# Patient Record
Sex: FEMALE | Race: WHITE | NOT HISPANIC OR LATINO | URBAN - METROPOLITAN AREA
[De-identification: names, ages, dates, MRNs, and addresses within clinical notes are randomized per-mention and may not be internally consistent; named-entity substitution may affect disease eponyms.]

---

## 2019-09-02 ENCOUNTER — EMERGENCY (EMERGENCY)
Facility: HOSPITAL | Age: 24
LOS: 1 days | Discharge: HOME | End: 2019-09-02
Attending: EMERGENCY MEDICINE | Admitting: EMERGENCY MEDICINE
Payer: MEDICARE

## 2019-09-02 VITALS
SYSTOLIC BLOOD PRESSURE: 107 MMHG | OXYGEN SATURATION: 99 % | RESPIRATION RATE: 18 BRPM | DIASTOLIC BLOOD PRESSURE: 75 MMHG | TEMPERATURE: 98 F | HEART RATE: 107 BPM

## 2019-09-02 DIAGNOSIS — R53.83 OTHER FATIGUE: ICD-10-CM

## 2019-09-02 DIAGNOSIS — R68.83 CHILLS (WITHOUT FEVER): ICD-10-CM

## 2019-09-02 DIAGNOSIS — R11.0 NAUSEA: ICD-10-CM

## 2019-09-02 DIAGNOSIS — R00.0 TACHYCARDIA, UNSPECIFIED: ICD-10-CM

## 2019-09-02 PROCEDURE — 99284 EMERGENCY DEPT VISIT MOD MDM: CPT

## 2019-09-02 NOTE — ED PROVIDER NOTE - NSFOLLOWUPINSTRUCTIONS_ED_ALL_ED_FT
Can take tylenol or motrin every 6hrs as needed for pain.  Stay well hydrated.  Try to increase food/protein intake,  Return for fevers, persistent vomit, uncontrolled pain, worsening breathing, worsening lightheaded.  Follow up with primary doctor within 1-2 days.   Follow up with psychiatrist.

## 2019-09-02 NOTE — ED PROVIDER NOTE - PHYSICAL EXAMINATION
no LE edema, normal equal distal pulses.  neck FROM. Strength 5/5. No bony ttp, FROM all extremities. Normal equal distal pulses. Steady unassisted gait.

## 2019-09-02 NOTE — ED PROVIDER NOTE - PROGRESS NOTE DETAILS
[place]: initial labs hemolyzed. glucose 60s. Hydrated, repeat labs improved. Pt feeling much better. BP is borderline. Pt unsure of baseline BP. Has no symptoms of hypotension even while ambulating in ED. Observed for several hrs w/ only improvement in symptoms. Comfortable for dc, outpt pmd /psych f/u.

## 2019-09-02 NOTE — ED PROVIDER NOTE - PATIENT PORTAL LINK FT
You can access the FollowMyHealth Patient Portal offered by [facility] by registering at the following website: [email or web address]. By joining [facility]’s FollowMyHealth portal, you will also be able to view your health information using other applications (apps) compatible with our system.

## 2019-09-02 NOTE — ED ADULT TRIAGE NOTE - ARRIVAL INFO ADDITIONAL COMMENTS
pt has hx of anorexia and hyponatremia and father called 911 tonight for increased tiredness and feeling cold.  pt denies pain, sob.

## 2019-09-02 NOTE — ED ADULT NURSE NOTE - NSIMPLEMENTINTERV_GEN_ALL_ED
Implemented All Universal Safety Interventions:  [place] to call system. Call bell, personal items and telephone within reach. Instruct patient to call for assistance. Room bathroom lighting operational. Non-slip footwear when patient is off stretcher. Physically safe environment: no spills, clutter or unnecessary equipment. Stretcher in lowest position, wheels locked, appropriate side rails in place.

## 2019-09-02 NOTE — ED ADULT NURSE NOTE - OBJECTIVE STATEMENT
Patient to the ED with complaint of feeling tired, sleepy, cold a bit nauseated and has a sore throat, has hx of OCD and eating disorder

## 2019-09-02 NOTE — ED PROVIDER NOTE - CLINICAL SUMMARY MEDICAL DECISION MAKING FREE TEXT BOX
24F PMH anorexia, p/w feeling cold/fatigue. Pt states that she was feeling cold/shivers tonight, took her temp and was low at 94, so called EMS. Shivers/cold now resolved. Occasional minimal nausea. Also states that she has been following w/ PMD Dr. Saenz and recent labs have noted "low WBC and low Na" slowly declining, unsure of exact numbers. Started on Na tablets. Her PMD presumes 2/2 eating disorder. Her psychiatrist increased zyprexa dose and started on 1mg klonopin qhs ~1w ago. Since then pt states she is waking up much later in the day. Slight tachycardia, other vitals wnl, exam as above. Well appearing.  ddx: Possible metabolic component, less likely developing infection. Likely aspect of dehydration.   CBC, cmp, TSH, hcg, IVF, reassess.

## 2019-09-03 VITALS
SYSTOLIC BLOOD PRESSURE: 92 MMHG | DIASTOLIC BLOOD PRESSURE: 59 MMHG | OXYGEN SATURATION: 98 % | RESPIRATION RATE: 18 BRPM | HEART RATE: 76 BPM

## 2019-09-03 LAB
ALBUMIN SERPL ELPH-MCNC: 3.4 G/DL — SIGNIFICANT CHANGE UP (ref 3.3–5)
ALBUMIN SERPL ELPH-MCNC: 4.4 G/DL — SIGNIFICANT CHANGE UP (ref 3.3–5)
ALP SERPL-CCNC: 40 U/L — SIGNIFICANT CHANGE UP (ref 40–120)
ALP SERPL-CCNC: SIGNIFICANT CHANGE UP U/L (ref 40–120)
ALT FLD-CCNC: 15 U/L — SIGNIFICANT CHANGE UP (ref 10–45)
ALT FLD-CCNC: SIGNIFICANT CHANGE UP U/L (ref 10–45)
ANION GAP SERPL CALC-SCNC: 11 MMOL/L — SIGNIFICANT CHANGE UP (ref 5–17)
ANION GAP SERPL CALC-SCNC: 9 MMOL/L — SIGNIFICANT CHANGE UP (ref 5–17)
AST SERPL-CCNC: 25 U/L — SIGNIFICANT CHANGE UP (ref 10–40)
AST SERPL-CCNC: SIGNIFICANT CHANGE UP U/L (ref 10–40)
BASOPHILS # BLD AUTO: 0.04 K/UL — SIGNIFICANT CHANGE UP (ref 0–0.2)
BASOPHILS NFR BLD AUTO: 1 % — SIGNIFICANT CHANGE UP (ref 0–2)
BILIRUB SERPL-MCNC: 0.2 MG/DL — SIGNIFICANT CHANGE UP (ref 0.2–1.2)
BILIRUB SERPL-MCNC: 0.3 MG/DL — SIGNIFICANT CHANGE UP (ref 0.2–1.2)
BUN SERPL-MCNC: 4 MG/DL — LOW (ref 7–23)
BUN SERPL-MCNC: 4 MG/DL — LOW (ref 7–23)
CALCIUM SERPL-MCNC: 7.2 MG/DL — LOW (ref 8.4–10.5)
CALCIUM SERPL-MCNC: 9 MG/DL — SIGNIFICANT CHANGE UP (ref 8.4–10.5)
CHLORIDE SERPL-SCNC: 103 MMOL/L — SIGNIFICANT CHANGE UP (ref 96–108)
CHLORIDE SERPL-SCNC: 93 MMOL/L — LOW (ref 96–108)
CO2 SERPL-SCNC: 25 MMOL/L — SIGNIFICANT CHANGE UP (ref 22–31)
CO2 SERPL-SCNC: 25 MMOL/L — SIGNIFICANT CHANGE UP (ref 22–31)
CREAT SERPL-MCNC: 0.57 MG/DL — SIGNIFICANT CHANGE UP (ref 0.5–1.3)
CREAT SERPL-MCNC: 0.61 MG/DL — SIGNIFICANT CHANGE UP (ref 0.5–1.3)
EOSINOPHIL # BLD AUTO: 0.02 K/UL — SIGNIFICANT CHANGE UP (ref 0–0.5)
EOSINOPHIL NFR BLD AUTO: 0.5 % — SIGNIFICANT CHANGE UP (ref 0–6)
GLUCOSE SERPL-MCNC: 289 MG/DL — HIGH (ref 70–99)
GLUCOSE SERPL-MCNC: 64 MG/DL — LOW (ref 70–99)
HCG SERPL-ACNC: <0 MIU/ML — SIGNIFICANT CHANGE UP
HCT VFR BLD CALC: 34.1 % — LOW (ref 34.5–45)
HGB BLD-MCNC: 11.2 G/DL — LOW (ref 11.5–15.5)
IMM GRANULOCYTES NFR BLD AUTO: 0.3 % — SIGNIFICANT CHANGE UP (ref 0–1.5)
LYMPHOCYTES # BLD AUTO: 1.36 K/UL — SIGNIFICANT CHANGE UP (ref 1–3.3)
LYMPHOCYTES # BLD AUTO: 34.7 % — SIGNIFICANT CHANGE UP (ref 13–44)
MCHC RBC-ENTMCNC: 29 PG — SIGNIFICANT CHANGE UP (ref 27–34)
MCHC RBC-ENTMCNC: 32.8 GM/DL — SIGNIFICANT CHANGE UP (ref 32–36)
MCV RBC AUTO: 88.3 FL — SIGNIFICANT CHANGE UP (ref 80–100)
MONOCYTES # BLD AUTO: 0.39 K/UL — SIGNIFICANT CHANGE UP (ref 0–0.9)
MONOCYTES NFR BLD AUTO: 9.9 % — SIGNIFICANT CHANGE UP (ref 2–14)
NEUTROPHILS # BLD AUTO: 2.1 K/UL — SIGNIFICANT CHANGE UP (ref 1.8–7.4)
NEUTROPHILS NFR BLD AUTO: 53.6 % — SIGNIFICANT CHANGE UP (ref 43–77)
NRBC # BLD: 0 /100 WBCS — SIGNIFICANT CHANGE UP (ref 0–0)
PLATELET # BLD AUTO: 294 K/UL — SIGNIFICANT CHANGE UP (ref 150–400)
POTASSIUM SERPL-MCNC: 3.3 MMOL/L — LOW (ref 3.5–5.3)
POTASSIUM SERPL-MCNC: SIGNIFICANT CHANGE UP MMOL/L (ref 3.5–5.3)
POTASSIUM SERPL-SCNC: 3.3 MMOL/L — LOW (ref 3.5–5.3)
POTASSIUM SERPL-SCNC: SIGNIFICANT CHANGE UP MMOL/L (ref 3.5–5.3)
PROT SERPL-MCNC: 5.1 G/DL — LOW (ref 6–8.3)
PROT SERPL-MCNC: 7 G/DL — SIGNIFICANT CHANGE UP (ref 6–8.3)
RBC # BLD: 3.86 M/UL — SIGNIFICANT CHANGE UP (ref 3.8–5.2)
RBC # FLD: 11.7 % — SIGNIFICANT CHANGE UP (ref 10.3–14.5)
SODIUM SERPL-SCNC: 129 MMOL/L — LOW (ref 135–145)
SODIUM SERPL-SCNC: 137 MMOL/L — SIGNIFICANT CHANGE UP (ref 135–145)
TSH SERPL-MCNC: 0.9 UIU/ML — SIGNIFICANT CHANGE UP (ref 0.35–4.94)
WBC # BLD: 3.92 K/UL — SIGNIFICANT CHANGE UP (ref 3.8–10.5)
WBC # FLD AUTO: 3.92 K/UL — SIGNIFICANT CHANGE UP (ref 3.8–10.5)

## 2019-09-03 PROCEDURE — 36415 COLL VENOUS BLD VENIPUNCTURE: CPT

## 2019-09-03 PROCEDURE — 96360 HYDRATION IV INFUSION INIT: CPT

## 2019-09-03 PROCEDURE — 84702 CHORIONIC GONADOTROPIN TEST: CPT

## 2019-09-03 PROCEDURE — 99283 EMERGENCY DEPT VISIT LOW MDM: CPT | Mod: 25

## 2019-09-03 PROCEDURE — 80053 COMPREHEN METABOLIC PANEL: CPT

## 2019-09-03 PROCEDURE — 96361 HYDRATE IV INFUSION ADD-ON: CPT

## 2019-09-03 PROCEDURE — 84443 ASSAY THYROID STIM HORMONE: CPT

## 2019-09-03 PROCEDURE — 85025 COMPLETE CBC W/AUTO DIFF WBC: CPT

## 2019-09-03 RX ORDER — SODIUM CHLORIDE 9 MG/ML
1000 INJECTION INTRAMUSCULAR; INTRAVENOUS; SUBCUTANEOUS ONCE
Refills: 0 | Status: COMPLETED | OUTPATIENT
Start: 2019-09-03 | End: 2019-09-03

## 2019-09-03 RX ORDER — SODIUM CHLORIDE 9 MG/ML
1000 INJECTION, SOLUTION INTRAVENOUS
Refills: 0 | Status: DISCONTINUED | OUTPATIENT
Start: 2019-09-03 | End: 2019-09-11

## 2019-09-03 RX ADMIN — SODIUM CHLORIDE 1000 MILLILITER(S): 9 INJECTION, SOLUTION INTRAVENOUS at 01:08

## 2019-09-03 RX ADMIN — SODIUM CHLORIDE 1000 MILLILITER(S): 9 INJECTION INTRAMUSCULAR; INTRAVENOUS; SUBCUTANEOUS at 00:04

## 2019-09-03 RX ADMIN — SODIUM CHLORIDE 1000 MILLILITER(S): 9 INJECTION INTRAMUSCULAR; INTRAVENOUS; SUBCUTANEOUS at 04:39

## 2019-09-03 RX ADMIN — SODIUM CHLORIDE 1000 MILLILITER(S): 9 INJECTION INTRAMUSCULAR; INTRAVENOUS; SUBCUTANEOUS at 03:34

## 2019-09-03 RX ADMIN — SODIUM CHLORIDE 1000 MILLILITER(S): 9 INJECTION INTRAMUSCULAR; INTRAVENOUS; SUBCUTANEOUS at 01:10

## 2019-09-03 RX ADMIN — SODIUM CHLORIDE 1000 MILLILITER(S): 9 INJECTION, SOLUTION INTRAVENOUS at 02:09

## 2019-09-26 ENCOUNTER — APPOINTMENT (OUTPATIENT)
Dept: NEPHROLOGY | Facility: CLINIC | Age: 24
End: 2019-09-26
Payer: MEDICARE

## 2019-09-26 ENCOUNTER — LABORATORY RESULT (OUTPATIENT)
Age: 24
End: 2019-09-26

## 2019-09-26 VITALS — HEART RATE: 96 BPM | DIASTOLIC BLOOD PRESSURE: 20 MMHG | SYSTOLIC BLOOD PRESSURE: 82 MMHG

## 2019-09-26 VITALS — HEIGHT: 62 IN | HEART RATE: 72 BPM | BODY MASS INDEX: 15.51 KG/M2 | WEIGHT: 84.25 LBS

## 2019-09-26 VITALS — DIASTOLIC BLOOD PRESSURE: 62 MMHG | HEART RATE: 84 BPM | SYSTOLIC BLOOD PRESSURE: 88 MMHG

## 2019-09-26 VITALS — DIASTOLIC BLOOD PRESSURE: 56 MMHG | HEART RATE: 84 BPM | SYSTOLIC BLOOD PRESSURE: 90 MMHG

## 2019-09-26 DIAGNOSIS — Z00.00 ENCOUNTER FOR GENERAL ADULT MEDICAL EXAMINATION W/OUT ABNORMAL FINDINGS: ICD-10-CM

## 2019-09-26 PROCEDURE — 99205 OFFICE O/P NEW HI 60 MIN: CPT | Mod: 25

## 2019-09-26 PROCEDURE — 36415 COLL VENOUS BLD VENIPUNCTURE: CPT

## 2019-09-26 PROCEDURE — 93000 ELECTROCARDIOGRAM COMPLETE: CPT

## 2019-09-26 NOTE — PHYSICAL EXAM
[General Appearance - Alert] : alert [General Appearance - In No Acute Distress] : in no acute distress [PERRL With Normal Accommodation] : pupils were equal in size, round, and reactive to light [Sclera] : the sclera and conjunctiva were normal [Extraocular Movements] : extraocular movements were intact [Outer Ear] : the ears and nose were normal in appearance [Neck Appearance] : the appearance of the neck was normal [Oropharynx] : the oropharynx was normal [Neck Cervical Mass (___cm)] : no neck mass was observed [Jugular Venous Distention Increased] : there was no jugular-venous distention [Thyroid Nodule] : there were no palpable thyroid nodules [Thyroid Diffuse Enlargement] : the thyroid was not enlarged [Heart Rate And Rhythm] : heart rate was normal and rhythm regular [Auscultation Breath Sounds / Voice Sounds] : lungs were clear to auscultation bilaterally [Heart Sounds] : normal S1 and S2 [Heart Sounds Gallop] : no gallops [Murmurs] : no murmurs [Heart Sounds Pericardial Friction Rub] : no pericardial rub [Edema] : there was no peripheral edema [Bowel Sounds] : normal bowel sounds [Abdomen Soft] : soft [Abdomen Tenderness] : non-tender [Abdomen Mass (___ Cm)] : no abdominal mass palpated [No CVA Tenderness] : no ~M costovertebral angle tenderness [No Spinal Tenderness] : no spinal tenderness [Abnormal Walk] : normal gait [Nail Clubbing] : no clubbing  or cyanosis of the fingernails [Musculoskeletal - Swelling] : no joint swelling seen [Motor Tone] : muscle strength and tone were normal [Skin Color & Pigmentation] : normal skin color and pigmentation [Skin Turgor] : normal skin turgor [] : no rash [Cranial Nerves] : cranial nerves 2-12 were intact [No Focal Deficits] : no focal deficits [Motor Exam] : the motor exam was normal [Oriented To Time, Place, And Person] : oriented to person, place, and time [Impaired Insight] : insight and judgment were intact [FreeTextEntry1] : apathetic

## 2019-09-26 NOTE — ADDENDUM
[FreeTextEntry1] :  Documented by Dion Bledsoe acting as a scribe for Dr. Justin Redd on 09/26/2019.

## 2019-09-26 NOTE — END OF VISIT
[FreeTextEntry3] : All medical record entries made by the Scribe were at my, Dr. Justin Redd, direction and personally dictated by me on 09/26/2019. I have reviewed the chart and agree that the record accurately reflects my personal performance of the history, physical exam, assessment and plan. I have also personally directed, reviewed, and agreed with the chart.

## 2019-09-26 NOTE — HISTORY OF PRESENT ILLNESS
[FreeTextEntry1] : The patient is a 24 year old female presenting for initial evaluation of psychiatric status and hyponatremia.\par \par Chief Complaint:\par - Patient reports that she has "not felt well" for about eight years. She states that she doesn't leave the house much due to energy levels and OCD "rituals." She also has anorexia for which says she is trying to eat more now, such as fruits and ice cream and is able to keep it down. Her father says that the eating disorder preceded the OCD tendencies. She denies bulimia, having taken water pills or medication to induce diarrhea. She does take MiraLAX for constipation. She is unable to articulate what prevents her from eating. She c/o often have abdominal pains.\par - She reports that her menstrual periods are not always normal. She says that she sleeps a lot during the day. She reports often feeling chronically cold. She has been told she has gum recession and she has pain in her wisdom teeth; she has yet to be evaluated for this.\par - Reports she has had recent brain MRI at [facility] within the last year.\par \par Past Medical History:\par - h/o OCD: Pt describes her "rituals" as cleanliness and mouth care. She sees a psychiatrist Dr. Watts (tel: [phone])  in [place]. She has taken Anafranil in the past.\par - h/o anorexia over +8 years. She reports occasional dysphagia, but it has been many years since she had a true problem with swallowing. \par - h/o hyponatremia.\par - h/o hair thinning.\par - She denies problems with dermatology, ENT (other than occasional mild dysphagia), eyes, pulmonary, cardiology, renal, arthritis.\par \par Past Surgical History/ Hospitalizations:\par - She has been hospitalized for hyponatremia.\par - No surgical history.\par \par Family History:\par - Patient has 10 siblings all reportedly heathy except one with Downs Syndrome.\par - Grandmother with diabetes and HTN. \par - No other significant FMH.\par \par Social History:\par - She was born and raised in [place], [place] currently lives with her grandparents in [place]. Patient spends most of her time at home. 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 She denies smoking, EtOH use, does not live with pets. She has visited family in [place] somewhat recently.\par \par Current Medications: Klonopin 1mg QD, Zyprexa 10mg QD, paroxetine 10mg QD, Na BID.\par \par Allergies: NKDA; except one psychiatric medication that she cannot remember.

## 2019-09-26 NOTE — ASSESSMENT
[FreeTextEntry1] : Assessment: Patient is a 24 year old female with a PMH of OCD, anorexia, and hyponatremia presenting today for initial evaluation of psychiatric status and hyponatremia. Patient is accompanied by her father who assisted in gathering her history. She lives in [place], [place] with her grandparents. Her blood pressure in office today is hypotensive and orthostatic. EKG taken in office today is normal. \par Spoke with Dr. Watts to discuss the possibility that there is something organic to patient's mental status. Have advised patient to retrieve the disk from her recent MRI. Have advised patient to have a neuropsychology evaluation.\par \par Changes to Medications: none at this time\par \par EKG taken, results above. Labs were drawn and we will talk to patient next week after lab results return to schedule a follow-up appointment.

## 2019-10-01 LAB
24R-OH-CALCIDIOL SERPL-MCNC: 42.1 PG/ML
25(OH)D3 SERPL-MCNC: 21.6 NG/ML
ACTH SER-ACNC: 18.1 PG/ML
ALBUMIN MFR SERPL ELPH: 65.4 %
ALBUMIN SERPL ELPH-MCNC: 4.5 G/DL
ALBUMIN SERPL-MCNC: 4.4 G/DL
ALBUMIN/GLOB SERPL: 1.8 RATIO
ALDOSTERONE SERUM: 23.4 NG/DL
ALP BLD-CCNC: 54 U/L
ALPHA1 GLOB MFR SERPL ELPH: 3.3 %
ALPHA1 GLOB SERPL ELPH-MCNC: 0.2 G/DL
ALPHA2 GLOB MFR SERPL ELPH: 8.3 %
ALPHA2 GLOB SERPL ELPH-MCNC: 0.6 G/DL
ALT SERPL-CCNC: 15 U/L
ANA SER IF-ACNC: NEGATIVE
ANION GAP SERPL CALC-SCNC: 17 MMOL/L
APPEARANCE: CLEAR
AST SERPL-CCNC: 29 U/L
B-GLOBULIN MFR SERPL ELPH: 10.5 %
B-GLOBULIN SERPL ELPH-MCNC: 0.7 G/DL
B2 GLYCOPROT1 IGA SERPL IA-ACNC: 20.8 SAU
B2 GLYCOPROT1 IGG SER-ACNC: 11.2 SGU
B2 GLYCOPROT1 IGM SER-ACNC: <5 SMU
B2 MICROGLOB SERPL-MCNC: 1.6 MG/L
BACTERIA UR CULT: NORMAL
BACTERIA: NEGATIVE
BASOPHILS # BLD AUTO: 0.04 K/UL
BASOPHILS NFR BLD AUTO: 1.2 %
BILIRUB DIRECT SERPL-MCNC: 0.1 MG/DL
BILIRUB SERPL-MCNC: 0.2 MG/DL
BILIRUBIN URINE: NEGATIVE
BLOOD URINE: NEGATIVE
BUN SERPL-MCNC: <3 MG/DL
C3 SERPL-MCNC: 59 MG/DL
C4 SERPL-MCNC: 14 MG/DL
CA-I SERPL-SCNC: 1.24 MMOL/L
CALCIUM SERPL-MCNC: 9.7 MG/DL
CALCIUM SERPL-MCNC: 9.7 MG/DL
CARDIOLIPIN AB SER IA-ACNC: NEGATIVE
CARDIOLIPIN IGM SER-MCNC: 6.3 MPL
CARDIOLIPIN IGM SER-MCNC: <5 GPL
CHLORIDE SERPL-SCNC: 93 MMOL/L
CHOLEST SERPL-MCNC: 188 MG/DL
CHOLEST/HDLC SERPL: 1.6 RATIO
CO2 SERPL-SCNC: 20 MMOL/L
COLLAGEN CTX SERPL-MCNC: 1108 PG/ML
COLOR: COLORLESS
CORTIS SERPL-MCNC: 13.8 UG/DL
CREAT SERPL-MCNC: 0.71 MG/DL
CRP SERPL-MCNC: <0.1 MG/DL
CYSTATIN C SERPL-MCNC: 0.65 MG/L
DEPRECATED D DIMER PPP IA-ACNC: <150 NG/ML DDU
DEPRECATED KAPPA LC FREE/LAMBDA SER: 0.85 RATIO
DEPRECATED KAPPA LC FREE/LAMBDA SER: 0.85 RATIO
DHEA-S SERPL-MCNC: 237 UG/DL
ENA SCL70 IGG SER IA-ACNC: <0.2 AL
EOSINOPHIL # BLD AUTO: 0.02 K/UL
EOSINOPHIL NFR BLD AUTO: 0.6 %
ERYTHROCYTE [SEDIMENTATION RATE] IN BLOOD BY WESTERGREN METHOD: 2 MM/HR
ESTRADIOL SERPL-MCNC: 36 PG/ML
FERRITIN SERPL-MCNC: 20 NG/ML
FOLATE SERPL-MCNC: 8.2 NG/ML
FSH SERPL-MCNC: 7.5 IU/L
GAMMA GLOB FLD ELPH-MCNC: 0.8 G/DL
GAMMA GLOB MFR SERPL ELPH: 12.5 %
GFR/BSA.PRED SERPLBLD CYS-BASED-ARV: 124 ML/MIN
GLUCOSE QUALITATIVE U: NEGATIVE
GLUCOSE SERPL-MCNC: 84 MG/DL
HBV SURFACE AB SER QL: REACTIVE
HBV SURFACE AG SER QL: NONREACTIVE
HCT VFR BLD CALC: 32.9 %
HCV AB SER QL: NONREACTIVE
HCV S/CO RATIO: 0.11 S/CO
HCYS SERPL-MCNC: 19.6 UMOL/L
HDLC SERPL-MCNC: 117 MG/DL
HGB BLD-MCNC: 11 G/DL
HYALINE CASTS: 0 /LPF
IC SERPL QL C1Q BIND: < 1.2 MCG EQ/ML
IGA SER QL IEP: 246 MG/DL
IGG SER QL IEP: 828 MG/DL
IGM SER QL IEP: 100 MG/DL
IMM GRANULOCYTES NFR BLD AUTO: 0.3 %
INTERPRETATION SERPL IEP-IMP: NORMAL
IRON SATN MFR SERPL: 15 %
IRON SERPL-MCNC: 50 UG/DL
KAPPA LC CSF-MCNC: 1.18 MG/DL
KAPPA LC CSF-MCNC: 1.18 MG/DL
KAPPA LC SERPL-MCNC: 1 MG/DL
KAPPA LC SERPL-MCNC: 1 MG/DL
KETONES URINE: NEGATIVE
LDLC SERPL CALC-MCNC: 55 MG/DL
LEUKOCYTE ESTERASE URINE: NEGATIVE
LH SERPL-ACNC: 2.7 IU/L
LYMPHOCYTES # BLD AUTO: 1 K/UL
LYMPHOCYTES NFR BLD AUTO: 31.2 %
M PROTEIN SPEC IFE-MCNC: NORMAL
MAGNESIUM SERPL-MCNC: 2.1 MG/DL
MAN DIFF?: NORMAL
MCHC RBC-ENTMCNC: 28.9 PG
MCHC RBC-ENTMCNC: 33.4 GM/DL
MCV RBC AUTO: 86.6 FL
METHYLMALONATE SERPL-SCNC: 135 NMOL/L
MICROSCOPIC-UA: NORMAL
MONOCYTES # BLD AUTO: 0.2 K/UL
MONOCYTES NFR BLD AUTO: 6.2 %
MPO AB + PR3 PNL SER: NORMAL
NEUTROPHILS # BLD AUTO: 1.94 K/UL
NEUTROPHILS NFR BLD AUTO: 60.5 %
NITRITE URINE: NEGATIVE
OSMOLALITY SERPL: 260 MOSMOL/KG
PARATHYROID HORMONE INTACT: 25 PG/ML
PH URINE: 7
PHOSPHATE SERPL-MCNC: 4.4 MG/DL
PLATELET # BLD AUTO: 276 K/UL
POTASSIUM SERPL-SCNC: 4.8 MMOL/L
PROLACTIN SERPL-MCNC: 14.6 NG/ML
PROT SERPL-MCNC: 6.8 G/DL
PROTEIN URINE: NEGATIVE
RBC # BLD: 3.8 M/UL
RBC # FLD: 12.3 %
RED BLOOD CELLS URINE: 0 /HPF
RENIN PLASMA: 3.3 PG/ML
RHEUMATOID FACT SER QL: <10 IU/ML
SMOOTH MUSCLE AB SER QL IF: NORMAL
SODIUM SERPL-SCNC: 130 MMOL/L
SPECIFIC GRAVITY URINE: 1
SQUAMOUS EPITHELIAL CELLS: 1 /HPF
T3FREE SERPL-MCNC: 2.69 PG/ML
T3RU NFR SERPL: 1.2 TBI
T4 FREE SERPL-MCNC: 1.1 NG/DL
T4 SERPL-MCNC: 7.1 UG/DL
THYROGLOB AB SERPL-ACNC: <20 IU/ML
THYROPEROXIDASE AB SERPL IA-ACNC: <10 IU/ML
TIBC SERPL-MCNC: 340 UG/DL
TRIGL SERPL-MCNC: 81 MG/DL
TSH SERPL-ACNC: 2.29 UIU/ML
UIBC SERPL-MCNC: 290 UG/DL
URATE SERPL-MCNC: 2.9 MG/DL
UROBILINOGEN URINE: NORMAL
VIT B12 SERPL-MCNC: 626 PG/ML
WBC # FLD AUTO: 3.21 K/UL
WHITE BLOOD CELLS URINE: 0 /HPF
ZPP BLD-MCNC: 40 MCG/DL

## 2019-10-04 LAB
ALP BONE SERPL-MCNC: 9.6 MCG/L
CELIAC DISEASE INTERPRETATION: NORMAL
CELIAC GENE PAIRS PRESENT: YES
CORPROPORPHRYIN PLASMA: 0.6 MCG/L
DQ ALPHA 1: NORMAL
DQ BETA 1: NORMAL
GAD65 AB SER-MCNC: 0 NMOL/L
HEPTACARBOXYPORPHRYIN PLASMA: NORMAL MCG/L
HEXACARBOXYPORPHRYIN PLASMA: NORMAL MCG/L
IMMUNOGLOBULIN A (IGA): 238 MG/DL
PENTACARBOXYPORHPRYIN PLASMA: NORMAL MCG/L
PORPHRYINS PLASMA INTERP: NORMAL
PROTOPORPHRYIN PLASMA: NORMAL MCG/L
TOTAL PORPHRYINS: 0.6 MCG/L
UROPORPHRYIN PLASMA: NORMAL MCG/L

## 2019-10-10 LAB
ANNOTATION COMMENT IMP: NORMAL
HLA-DQ2: POSITIVE
HLA-DQ8 QL: POSITIVE
REF LAB TEST METHOD: NORMAL

## 2019-11-11 ENCOUNTER — LABORATORY RESULT (OUTPATIENT)
Age: 24
End: 2019-11-11

## 2019-11-11 ENCOUNTER — APPOINTMENT (OUTPATIENT)
Dept: NEPHROLOGY | Facility: CLINIC | Age: 24
End: 2019-11-11
Payer: MEDICARE

## 2019-11-11 VITALS — WEIGHT: 81.25 LBS | HEIGHT: 62 IN | OXYGEN SATURATION: 98 % | BODY MASS INDEX: 14.95 KG/M2 | HEART RATE: 72 BPM

## 2019-11-11 VITALS — DIASTOLIC BLOOD PRESSURE: 66 MMHG | HEART RATE: 72 BPM | SYSTOLIC BLOOD PRESSURE: 96 MMHG

## 2019-11-11 VITALS — SYSTOLIC BLOOD PRESSURE: 96 MMHG | HEART RATE: 84 BPM | DIASTOLIC BLOOD PRESSURE: 70 MMHG

## 2019-11-11 VITALS — DIASTOLIC BLOOD PRESSURE: 70 MMHG | SYSTOLIC BLOOD PRESSURE: 98 MMHG | HEART RATE: 72 BPM

## 2019-11-11 DIAGNOSIS — F54 POLYDIPSIA: ICD-10-CM

## 2019-11-11 DIAGNOSIS — I95.9 HYPOTENSION, UNSPECIFIED: ICD-10-CM

## 2019-11-11 DIAGNOSIS — F50.9 EATING DISORDER, UNSPECIFIED: ICD-10-CM

## 2019-11-11 DIAGNOSIS — R63.1 POLYDIPSIA: ICD-10-CM

## 2019-11-11 DIAGNOSIS — F42.9 OBSESSIVE-COMPULSIVE DISORDER, UNSPECIFIED: ICD-10-CM

## 2019-11-11 DIAGNOSIS — E87.1 HYPO-OSMOLALITY AND HYPONATREMIA: ICD-10-CM

## 2019-11-11 PROCEDURE — 36415 COLL VENOUS BLD VENIPUNCTURE: CPT

## 2019-11-11 PROCEDURE — 99214 OFFICE O/P EST MOD 30 MIN: CPT | Mod: 25

## 2019-11-11 NOTE — ASSESSMENT
[FreeTextEntry1] : Plan:\par 1) Hyponatremia: Patient was reportedly hospitalized last week with a Na of 120 and have advised patient's father to request her discharge notes to be sent to our office. On her labs from 9/26/19 Na was 130. We will repeat blood and urine tests today.\par 2) Primary Polydipsia: Patient states she is drinking in excess of four quarts of water per day. Advised patient to keep a log of her water intake to better monitor her polydipsia. Will assess labs today.\par 3) Patient blood pressure is hypotensive in office today.\par 4) Spoke with Dr. Watts, patient's psychiatrist, who advised that patient's eating disorder along with hyponatremia may be adaptive in helping her circumvent some of her expected lifestyle choices that may be associated with her family and communal status.\par \par Changes to Medications: none at this time\par \par Labs were drawn and patient will return in 1-3 weeks for a follow-up appointment pending the results of her outpatient workup and labs today.

## 2019-11-11 NOTE — END OF VISIT
[FreeTextEntry3] : All medical record entries made by the Scribe were at my, Dr. Justin Redd, direction and personally dictated by me on 11/11/2019. I have reviewed the chart and agree that the record accurately reflects my personal performance of the history, physical exam, assessment and plan. I have also personally directed, reviewed, and agreed with the chart.  [FreeTextEntry2] :  Documented by Dion Bledsoe acting as a scribe for Dr. Justin Redd on 11/11/2019.

## 2019-11-11 NOTE — HISTORY OF PRESENT ILLNESS
[FreeTextEntry1] : Patient is a 24 year old female with a PMH of OCD, anorexia, and hyponatremia presenting today for follow-up evaluation of psychiatric status and hyponatremia. \par \par Interval Data:\par - Labs from 9/26/19 reveal: WBC 3.21, HGB 11.0, HCT 32.9, cortisol PM 13.8, osmolality 260, homocysteine 19.6, Na 130, Cl 93, CO2 of 20, Vit D 25 of 21.6, C3 of 59, aldosterone 23.4, beta 2 glycoprotein 1 IgA Ab 20.8, collagen type I 1108, total porphyrins 0.6.\par \par Current Complaints:\par - Patient reports that she was admitted to [facility] last week for hyponatremia with Na of 120. She was reportedly placed on a feeding tube and had chest x-ray obtained. She reports that on average she drinks 1-1.5 gallons of water per day, and she states this is both out of habit and from thirst. Patient weighs 81lbs today, a loss of 3lbs from 6 weeks ago. Patient denies purging or taking diuretics. She states she has intermittent alternating constipation and loose stools. Patient reports her energy is intermittently low.\par - Her father brought in disc from recent MRI.\par - She denies taking Vit D, and denies ever having had a bone density test.\par \par Current Medications: Klonopin 1mg QD, Zyprexa 10mg QD, paroxetine 10mg QD, Na BID.

## 2019-11-11 NOTE — PHYSICAL EXAM
[General Appearance - Alert] : alert [Sclera] : the sclera and conjunctiva were normal [General Appearance - In No Acute Distress] : in no acute distress [Extraocular Movements] : extraocular movements were intact [PERRL With Normal Accommodation] : pupils were equal in size, round, and reactive to light [Outer Ear] : the ears and nose were normal in appearance [Neck Appearance] : the appearance of the neck was normal [Oropharynx] : the oropharynx was normal [Jugular Venous Distention Increased] : there was no jugular-venous distention [Neck Cervical Mass (___cm)] : no neck mass was observed [Thyroid Diffuse Enlargement] : the thyroid was not enlarged [Thyroid Nodule] : there were no palpable thyroid nodules [Auscultation Breath Sounds / Voice Sounds] : lungs were clear to auscultation bilaterally [Heart Rate And Rhythm] : heart rate was normal and rhythm regular [Heart Sounds] : normal S1 and S2 [Heart Sounds Gallop] : no gallops [Murmurs] : no murmurs [Edema] : there was no peripheral edema [Heart Sounds Pericardial Friction Rub] : no pericardial rub [Bowel Sounds] : normal bowel sounds [Abdomen Soft] : soft [Abdomen Tenderness] : non-tender [Abdomen Mass (___ Cm)] : no abdominal mass palpated [No CVA Tenderness] : no ~M costovertebral angle tenderness [No Spinal Tenderness] : no spinal tenderness [Abnormal Walk] : normal gait [Nail Clubbing] : no clubbing  or cyanosis of the fingernails [Musculoskeletal - Swelling] : no joint swelling seen [Motor Tone] : muscle strength and tone were normal [Skin Color & Pigmentation] : normal skin color and pigmentation [] : no rash [Skin Turgor] : normal skin turgor [Motor Exam] : the motor exam was normal [Cranial Nerves] : cranial nerves 2-12 were intact [Impaired Insight] : insight and judgment were intact [No Focal Deficits] : no focal deficits [Oriented To Time, Place, And Person] : oriented to person, place, and time [Affect] : the affect was normal

## 2019-11-11 NOTE — ADDENDUM
[FreeTextEntry1] :  Documented by Dion Bledsoe acting as a scribe for Dr. Justin Redd on 11/11/2019.

## 2019-11-12 LAB
24R-OH-CALCIDIOL SERPL-MCNC: 37 PG/ML
25(OH)D3 SERPL-MCNC: 22.5 NG/ML
ALBUMIN SERPL ELPH-MCNC: 4.2 G/DL
ALP BLD-CCNC: 52 U/L
ALT SERPL-CCNC: 12 U/L
ANION GAP SERPL CALC-SCNC: 14 MMOL/L
APPEARANCE: CLEAR
AST SERPL-CCNC: 23 U/L
BACTERIA: NEGATIVE
BASOPHILS # BLD AUTO: 0.02 K/UL
BASOPHILS NFR BLD AUTO: 0.9 %
BILIRUB SERPL-MCNC: 0.2 MG/DL
BILIRUBIN URINE: NEGATIVE
BLOOD URINE: NEGATIVE
BUN SERPL-MCNC: <3 MG/DL
CALCIUM SERPL-MCNC: 9.5 MG/DL
CALCIUM SERPL-MCNC: 9.5 MG/DL
CHLORIDE ?TM UR-SCNC: <20 MMOL/L
CHLORIDE SERPL-SCNC: 92 MMOL/L
CHOLEST SERPL-MCNC: 187 MG/DL
CHOLEST/HDLC SERPL: 2 RATIO
CO2 SERPL-SCNC: 24 MMOL/L
COLOR: COLORLESS
CORTIS SERPL-MCNC: 16.9 UG/DL
CREAT SERPL-MCNC: 0.64 MG/DL
EOSINOPHIL # BLD AUTO: 0.02 K/UL
EOSINOPHIL NFR BLD AUTO: 0.9 %
FERRITIN SERPL-MCNC: 27 NG/ML
FOLATE SERPL-MCNC: 6.6 NG/ML
GLUCOSE QUALITATIVE U: NEGATIVE
GLUCOSE SERPL-MCNC: 83 MG/DL
HAPTOGLOB SERPL-MCNC: 114 MG/DL
HBV SURFACE AB SER QL: REACTIVE
HBV SURFACE AG SER QL: NONREACTIVE
HCT VFR BLD CALC: 32.8 %
HCV AB SER QL: NONREACTIVE
HCV S/CO RATIO: 0.11 S/CO
HCYS SERPL-MCNC: 17.1 UMOL/L
HDLC SERPL-MCNC: 93 MG/DL
HGB BLD-MCNC: 10.7 G/DL
HYALINE CASTS: 0 /LPF
IMM GRANULOCYTES NFR BLD AUTO: 0 %
IRON SATN MFR SERPL: 8 %
IRON SERPL-MCNC: 27 UG/DL
KETONES URINE: NEGATIVE
LDH SERPL-CCNC: 172 U/L
LDLC SERPL CALC-MCNC: 76 MG/DL
LEUKOCYTE ESTERASE URINE: NEGATIVE
LYMPHOCYTES # BLD AUTO: 0.86 K/UL
LYMPHOCYTES NFR BLD AUTO: 38.9 %
MAGNESIUM SERPL-MCNC: 2.1 MG/DL
MAN DIFF?: NORMAL
MCHC RBC-ENTMCNC: 28.8 PG
MCHC RBC-ENTMCNC: 32.6 GM/DL
MCV RBC AUTO: 88.2 FL
MICROSCOPIC-UA: NORMAL
MONOCYTES # BLD AUTO: 0.21 K/UL
MONOCYTES NFR BLD AUTO: 9.5 %
NEUTROPHILS # BLD AUTO: 1.1 K/UL
NEUTROPHILS NFR BLD AUTO: 49.8 %
NITRITE URINE: NEGATIVE
OSMOLALITY SERPL: 265 MOSMOL/KG
OSMOLALITY UR: 48 MOSM/KG
PARATHYROID HORMONE INTACT: 24 PG/ML
PH URINE: 7
PHOSPHATE SERPL-MCNC: 4 MG/DL
PLATELET # BLD AUTO: 368 K/UL
POTASSIUM SERPL-SCNC: 4.6 MMOL/L
PROT SERPL-MCNC: 6.6 G/DL
PROTEIN URINE: NEGATIVE
RBC # BLD: 3.72 M/UL
RBC # BLD: 3.72 M/UL
RBC # FLD: 12.5 %
RED BLOOD CELLS URINE: 0 /HPF
RETICS # AUTO: 1.5 %
RETICS AGGREG/RBC NFR: 55.1 K/UL
RHEUMATOID FACT SER QL: 11 IU/ML
SODIUM ?TM SUB UR QN: <20 MMOL/L
SODIUM SERPL-SCNC: 129 MMOL/L
SPECIFIC GRAVITY URINE: 1
SQUAMOUS EPITHELIAL CELLS: 0 /HPF
T3FREE SERPL-MCNC: 1.83 PG/ML
T3RU NFR SERPL: 1.1 TBI
T4 FREE SERPL-MCNC: 0.9 NG/DL
T4 SERPL-MCNC: 6.1 UG/DL
TIBC SERPL-MCNC: 324 UG/DL
TRIGL SERPL-MCNC: 88 MG/DL
TSH SERPL-ACNC: 1.92 UIU/ML
UIBC SERPL-MCNC: 297 UG/DL
URATE SERPL-MCNC: 2.7 MG/DL
UROBILINOGEN URINE: NORMAL
VIT B12 SERPL-MCNC: 698 PG/ML
WBC # FLD AUTO: 2.21 K/UL
WHITE BLOOD CELLS URINE: 0 /HPF

## 2019-11-16 LAB
ACTH SER-ACNC: 13.1 PG/ML
ALBUMIN MFR SERPL ELPH: 60.3 %
ALBUMIN SERPL-MCNC: 4 G/DL
ALBUMIN/GLOB SERPL: 1.5 RATIO
ALDOSTERONE SERUM: 22.9 NG/DL
ALP BONE SERPL-MCNC: 6.4 MCG/L
ALPHA1 GLOB MFR SERPL ELPH: 4.6 %
ALPHA1 GLOB SERPL ELPH-MCNC: 0.3 G/DL
ALPHA2 GLOB MFR SERPL ELPH: 11.8 %
ALPHA2 GLOB SERPL ELPH-MCNC: 0.8 G/DL
ANA SER IF-ACNC: NEGATIVE
B-GLOBULIN MFR SERPL ELPH: 11.1 %
B-GLOBULIN SERPL ELPH-MCNC: 0.7 G/DL
C3 SERPL-MCNC: 79 MG/DL
C4 SERPL-MCNC: 25 MG/DL
COLLAGEN CTX SERPL-MCNC: 822 PG/ML
DEPRECATED KAPPA LC FREE/LAMBDA SER: 0.82 RATIO
DIRECT COOMBS: NORMAL
GAMMA GLOB FLD ELPH-MCNC: 0.8 G/DL
GAMMA GLOB MFR SERPL ELPH: 12.2 %
IGA SER QL IEP: 282 MG/DL
IGG SER QL IEP: 827 MG/DL
IGM SER QL IEP: 105 MG/DL
INTERPRETATION SERPL IEP-IMP: NORMAL
KAPPA LC CSF-MCNC: 1.54 MG/DL
KAPPA LC SERPL-MCNC: 1.27 MG/DL
M PROTEIN SPEC IFE-MCNC: NORMAL
METHYLMALONATE SERPL-SCNC: 141 NMOL/L
MPO AB + PR3 PNL SER: NORMAL
PROT SERPL-MCNC: 6.6 G/DL
PROT SERPL-MCNC: 6.6 G/DL
RENIN PLASMA: <2.1 PG/ML
THYROGLOB AB SERPL-ACNC: <20 IU/ML
THYROPEROXIDASE AB SERPL IA-ACNC: <10 IU/ML

## 2019-11-20 LAB
DHEA-SULFATE, SERUM: 180 UG/DL
DOPAMINE UR-MCNC: 43 PG/ML
EPINEPH UR-MCNC: <15 PG/ML
NOREPINEPH UR-MCNC: 914 PG/ML

## 2019-12-04 ENCOUNTER — APPOINTMENT (OUTPATIENT)
Dept: NEPHROLOGY | Facility: CLINIC | Age: 24
End: 2019-12-04

## 2023-08-20 ENCOUNTER — INPATIENT (INPATIENT)
Facility: HOSPITAL | Age: 28
LOS: 4 days | Discharge: HOME | DRG: 883 | End: 2023-08-25
Attending: SPECIALIST | Admitting: SPECIALIST
Payer: MEDICARE

## 2023-08-20 VITALS
SYSTOLIC BLOOD PRESSURE: 111 MMHG | DIASTOLIC BLOOD PRESSURE: 81 MMHG | OXYGEN SATURATION: 99 % | HEIGHT: 62 IN | RESPIRATION RATE: 16 BRPM | TEMPERATURE: 94 F | HEART RATE: 110 BPM

## 2023-08-20 DIAGNOSIS — R00.0 TACHYCARDIA, UNSPECIFIED: ICD-10-CM

## 2023-08-20 DIAGNOSIS — Z29.9 ENCOUNTER FOR PROPHYLACTIC MEASURES, UNSPECIFIED: ICD-10-CM

## 2023-08-20 DIAGNOSIS — R63.0 ANOREXIA: ICD-10-CM

## 2023-08-20 DIAGNOSIS — D72.819 DECREASED WHITE BLOOD CELL COUNT, UNSPECIFIED: ICD-10-CM

## 2023-08-20 DIAGNOSIS — Z93.1 GASTROSTOMY STATUS: Chronic | ICD-10-CM

## 2023-08-20 DIAGNOSIS — E87.20 ACIDOSIS, UNSPECIFIED: ICD-10-CM

## 2023-08-20 DIAGNOSIS — F41.9 ANXIETY DISORDER, UNSPECIFIED: ICD-10-CM

## 2023-08-20 LAB
ALBUMIN SERPL ELPH-MCNC: 4.7 G/DL — SIGNIFICANT CHANGE UP (ref 3.3–5)
ALP SERPL-CCNC: 74 U/L — SIGNIFICANT CHANGE UP (ref 40–120)
ALT FLD-CCNC: 9 U/L — LOW (ref 10–45)
ANION GAP SERPL CALC-SCNC: 26 MMOL/L — HIGH (ref 5–17)
AST SERPL-CCNC: 21 U/L — SIGNIFICANT CHANGE UP (ref 10–40)
B-OH-BUTYR SERPL-SCNC: 9.7 MMOL/L — HIGH
BASOPHILS # BLD AUTO: 0.06 K/UL — SIGNIFICANT CHANGE UP (ref 0–0.2)
BASOPHILS NFR BLD AUTO: 1.6 % — SIGNIFICANT CHANGE UP (ref 0–2)
BILIRUB SERPL-MCNC: 0.4 MG/DL — SIGNIFICANT CHANGE UP (ref 0.2–1.2)
BUN SERPL-MCNC: 21 MG/DL — SIGNIFICANT CHANGE UP (ref 7–23)
CALCIUM SERPL-MCNC: 10.2 MG/DL — SIGNIFICANT CHANGE UP (ref 8.4–10.5)
CHLORIDE SERPL-SCNC: 98 MMOL/L — SIGNIFICANT CHANGE UP (ref 96–108)
CO2 SERPL-SCNC: 18 MMOL/L — LOW (ref 22–31)
CREAT SERPL-MCNC: 0.81 MG/DL — SIGNIFICANT CHANGE UP (ref 0.5–1.3)
EGFR: 100 ML/MIN/1.73M2 — SIGNIFICANT CHANGE UP
EOSINOPHIL # BLD AUTO: 0.01 K/UL — SIGNIFICANT CHANGE UP (ref 0–0.5)
EOSINOPHIL NFR BLD AUTO: 0.3 % — SIGNIFICANT CHANGE UP (ref 0–6)
GAS PNL BLDV: SIGNIFICANT CHANGE UP
GLUCOSE SERPL-MCNC: 62 MG/DL — LOW (ref 70–99)
HCT VFR BLD CALC: 42.5 % — SIGNIFICANT CHANGE UP (ref 34.5–45)
HGB BLD-MCNC: 13.8 G/DL — SIGNIFICANT CHANGE UP (ref 11.5–15.5)
IMM GRANULOCYTES NFR BLD AUTO: 0 % — SIGNIFICANT CHANGE UP (ref 0–0.9)
LACTATE SERPL-SCNC: 1.2 MMOL/L — SIGNIFICANT CHANGE UP (ref 0.5–2)
LYMPHOCYTES # BLD AUTO: 1.21 K/UL — SIGNIFICANT CHANGE UP (ref 1–3.3)
LYMPHOCYTES # BLD AUTO: 31.9 % — SIGNIFICANT CHANGE UP (ref 13–44)
MAGNESIUM SERPL-MCNC: 2.2 MG/DL — SIGNIFICANT CHANGE UP (ref 1.6–2.6)
MCHC RBC-ENTMCNC: 30.7 PG — SIGNIFICANT CHANGE UP (ref 27–34)
MCHC RBC-ENTMCNC: 32.5 GM/DL — SIGNIFICANT CHANGE UP (ref 32–36)
MCV RBC AUTO: 94.7 FL — SIGNIFICANT CHANGE UP (ref 80–100)
MONOCYTES # BLD AUTO: 0.24 K/UL — SIGNIFICANT CHANGE UP (ref 0–0.9)
MONOCYTES NFR BLD AUTO: 6.3 % — SIGNIFICANT CHANGE UP (ref 2–14)
NEUTROPHILS # BLD AUTO: 2.27 K/UL — SIGNIFICANT CHANGE UP (ref 1.8–7.4)
NEUTROPHILS NFR BLD AUTO: 59.9 % — SIGNIFICANT CHANGE UP (ref 43–77)
NRBC # BLD: 0 /100 WBCS — SIGNIFICANT CHANGE UP (ref 0–0)
PHOSPHATE SERPL-MCNC: 3.9 MG/DL — SIGNIFICANT CHANGE UP (ref 2.5–4.5)
PLATELET # BLD AUTO: 293 K/UL — SIGNIFICANT CHANGE UP (ref 150–400)
POTASSIUM SERPL-MCNC: 4 MMOL/L — SIGNIFICANT CHANGE UP (ref 3.5–5.3)
POTASSIUM SERPL-SCNC: 4 MMOL/L — SIGNIFICANT CHANGE UP (ref 3.5–5.3)
PROT SERPL-MCNC: 8 G/DL — SIGNIFICANT CHANGE UP (ref 6–8.3)
RBC # BLD: 4.49 M/UL — SIGNIFICANT CHANGE UP (ref 3.8–5.2)
RBC # FLD: 13.2 % — SIGNIFICANT CHANGE UP (ref 10.3–14.5)
SODIUM SERPL-SCNC: 142 MMOL/L — SIGNIFICANT CHANGE UP (ref 135–145)
WBC # BLD: 3.79 K/UL — LOW (ref 3.8–10.5)
WBC # FLD AUTO: 3.79 K/UL — LOW (ref 3.8–10.5)

## 2023-08-20 PROCEDURE — 99285 EMERGENCY DEPT VISIT HI MDM: CPT

## 2023-08-20 RX ORDER — OLANZAPINE 15 MG/1
1 TABLET, FILM COATED ORAL
Qty: 0 | Refills: 0 | DISCHARGE

## 2023-08-20 RX ORDER — METOPROLOL TARTRATE 50 MG
0.5 TABLET ORAL
Refills: 0 | DISCHARGE

## 2023-08-20 RX ORDER — POLYETHYLENE GLYCOL 3350 17 G/17G
0 POWDER, FOR SOLUTION ORAL
Qty: 0 | Refills: 0 | DISCHARGE

## 2023-08-20 RX ORDER — SODIUM CHLORIDE 9 MG/ML
1000 INJECTION, SOLUTION INTRAVENOUS
Refills: 0 | Status: DISCONTINUED | OUTPATIENT
Start: 2023-08-20 | End: 2023-08-21

## 2023-08-20 RX ORDER — FOLIC ACID 0.8 MG
1 TABLET ORAL ONCE
Refills: 0 | Status: COMPLETED | OUTPATIENT
Start: 2023-08-20 | End: 2023-08-20

## 2023-08-20 RX ORDER — SODIUM CHLORIDE 9 MG/ML
500 INJECTION INTRAMUSCULAR; INTRAVENOUS; SUBCUTANEOUS ONCE
Refills: 0 | Status: COMPLETED | OUTPATIENT
Start: 2023-08-20 | End: 2023-08-20

## 2023-08-20 RX ORDER — CLONAZEPAM 1 MG
1 TABLET ORAL
Qty: 0 | Refills: 0 | DISCHARGE

## 2023-08-20 RX ORDER — FOLIC ACID 0.8 MG
1 TABLET ORAL EVERY 24 HOURS
Refills: 0 | Status: DISCONTINUED | OUTPATIENT
Start: 2023-08-21 | End: 2023-08-25

## 2023-08-20 RX ORDER — PANTOPRAZOLE SODIUM 20 MG/1
40 TABLET, DELAYED RELEASE ORAL EVERY 24 HOURS
Refills: 0 | Status: DISCONTINUED | OUTPATIENT
Start: 2023-08-21 | End: 2023-08-25

## 2023-08-20 RX ORDER — THIAMINE MONONITRATE (VIT B1) 100 MG
100 TABLET ORAL ONCE
Refills: 0 | Status: COMPLETED | OUTPATIENT
Start: 2023-08-20 | End: 2023-08-20

## 2023-08-20 RX ORDER — THIAMINE MONONITRATE (VIT B1) 100 MG
100 TABLET ORAL EVERY 24 HOURS
Refills: 0 | Status: DISCONTINUED | OUTPATIENT
Start: 2023-08-21 | End: 2023-08-25

## 2023-08-20 RX ORDER — SODIUM BICARBONATE 1 MEQ/ML
0 SYRINGE (ML) INTRAVENOUS
Qty: 0 | Refills: 0 | DISCHARGE

## 2023-08-20 RX ADMIN — Medication 100 MILLIGRAM(S): at 21:52

## 2023-08-20 RX ADMIN — Medication 1 MILLIGRAM(S): at 21:50

## 2023-08-20 RX ADMIN — SODIUM CHLORIDE 1000 MILLILITER(S): 9 INJECTION INTRAMUSCULAR; INTRAVENOUS; SUBCUTANEOUS at 20:26

## 2023-08-20 RX ADMIN — SODIUM CHLORIDE 75 MILLILITER(S): 9 INJECTION, SOLUTION INTRAVENOUS at 21:40

## 2023-08-20 NOTE — H&P ADULT - NSHPSOCIALHISTORY_GEN_ALL_CORE
Lives with aide. Has support from parents.  Can reportedly stand but does not appear very active.  No toxic habits.

## 2023-08-20 NOTE — H&P ADULT - PROBLEM SELECTOR PLAN 1
Longstanding hx. Used to have PEG tube in past. Has had NGT for last 1yr. Was dislodged 2 days prior.   Pt does not typically take anything by mouth at all.    - D5 fluids standing  - q6h FSG to monitor for hypoglycemia  - plan for PEG tube tomorrow Longstanding hx. Used to have PEG tube in past. Has had NGT for last 1yr. Was dislodged 2 days prior.   Pt does not typically take anything by mouth at all.    - D5 fluids standing  - q6h FSG to monitor for hypoglycemia  - thiamine and folic acid  - plan for PEG tube tomorrow Longstanding hx. Used to have PEG tube in past. Has had NGT for last 1yr. Pt pulled out NGT 2 days prior during emotional event but no SI or thoughts of hurting herself.   Pt does not typically take anything by mouth at all.    - D5 fluids standing  - q6h FSG to monitor for hypoglycemia  - thiamine and folic acid  - plan for PEG tube tomorrow  - consider Psych consult if pt amenable

## 2023-08-20 NOTE — H&P ADULT - PROBLEM SELECTOR PLAN 3
Chronic leukopenia since 2019 but no neutropenia.  Suspect initial temp not accurate since it was axillary. No infectious ROS.    - trend CBC

## 2023-08-20 NOTE — H&P ADULT - PROBLEM SELECTOR PLAN 5
Mother reports hx of fast HR, but not aware of afib.  Sinus on admission ecg.  Home med: lopressor 12.5 PO qhs  Tachycardia upon admission, unclear baseline HR but pt hasn't had BB in two days. Also dehydrated.    - monitor HR  - lopressor prn

## 2023-08-20 NOTE — H&P ADULT - NSHPREVIEWOFSYSTEMS_GEN_ALL_CORE
REVIEW OF SYSTEMS:  CONSTITUTIONAL: No weakness, fevers or chills  EYES/ENT: No visual changes;  No vertigo or throat pain   NECK: No pain or stiffness  RESPIRATORY: No cough, wheezing, hemoptysis; No shortness of breath  CARDIOVASCULAR: No chest pain or palpitations  GASTROINTESTINAL: No abdominal or epigastric pain. No nausea, vomiting, or hematemesis; No diarrhea or constipation. No melena or hematochezia.  GENITOURINARY: No dysuria, frequency or hematuria  NEUROLOGICAL: No numbness or weakness  SKIN: No itching, rashes  EXTREMITIES: No pain, no joint swelling  NEURO: No headache, no trauma  PSYCH: No anxiety, no sadness, no SI.

## 2023-08-20 NOTE — ED ADULT NURSE NOTE - NSFALLRISKINTERV_ED_ALL_ED
Assistance OOB with selected safe patient handling equipment if applicable/Assistance with ambulation/Communicate fall risk and risk factors to all staff, patient, and family/Monitor gait and stability/Provide visual cue: yellow wristband, yellow gown, etc/Reinforce activity limits and safety measures with patient and family/Call bell, personal items and telephone in reach/Instruct patient to call for assistance before getting out of bed/chair/stretcher/Non-slip footwear applied when patient is off stretcher/[place] to call system/Physically safe environment - no spills, clutter or unnecessary equipment/Purposeful Proactive Rounding/Room/bathroom lighting operational, light cord in reach

## 2023-08-20 NOTE — ED PROVIDER NOTE - NSICDXPASTMEDICALHX_GEN_ALL_CORE_FT
PAST MEDICAL HISTORY:  Anorexia nervosa     Eating disorder     OCD (obsessive compulsive disorder)     OCD (obsessive compulsive disorder)

## 2023-08-20 NOTE — ED PROVIDER NOTE - CLINICAL SUMMARY MEDICAL DECISION MAKING FREE TEXT BOX
anorexia/ feeding tube dependant here after ng tube dislodged. now for peg placement. appears thin/ pale but no distress. labs obtained to r/o electrolyte abnormality, dehydration, infection. given 500ml ivf bolus. discussed with Dr. Buckley, aware of pt. rec admit and will see in AM to schedule procedure anorexia/ feeding tube dependant here after ng tube dislodged. now for peg placement. appears thin/ pale but no distress. labs obtained to r/o electrolyte abnormality, dehydration, infection. given 500ml ivf bolus. discussed with Dr. Buckley, aware of pt. rec admit and will see in AM to schedule procedure  labs resulted with anion gap acidosis. added on vgb/lactate/beta hydroxy. suspect starvation ketoacidosis from chronic poor po intake. given thiamine/ folic acid iv, started d5/0.9ns infusion. admit for further treatment. anorexia/ feeding tube dependant here after ng tube dislodged. now for peg placement. appears thin/ pale but no distress. temp low on arrival but pt refused oral/rectal temp. was done axillary and unreliable. labs obtained to r/o electrolyte abnormality, dehydration, infection. given 500ml ivf bolus. discussed with Dr. Buckley, aware of pt. rec admit and will see in AM to schedule procedure  labs resulted with anion gap acidosis. added on vgb/lactate/beta hydroxy. suspect starvation ketoacidosis from chronic poor po intake. given thiamine/ folic acid iv, started d5/0.9ns infusion. admit for further treatment.

## 2023-08-20 NOTE — H&P ADULT - HISTORY OF PRESENT ILLNESS
27yo F PMH anorexia (NGT dependant, used to have PEG tube), OCD who presents here after ng tube dislodged 2 days ago. Pt reports feeling dry. Reports nasogastric tube fell out. Sometime her stomach bothers her a little but not currently. Does not have regular bowel movements, she's not sure when last one was; at baseline she might have one per week; urinates twice per day. Denies fever, chills, CP, palpitations, diarrhea, dysuria.  Pt defers most questions to mother, however very little information given.      ED course:   P/w temp 94.5 axillary (pt refused oral/rectal), -->repeat normal  labs: wbc 3.79, bicarb 16, AG 26, glucose 62, lactate 1.2, VBG pH 7.27  ecg: regular, sinus, QTc 495  Interventions: folic acid 1mg IV, thiamine 100mg IV, NS 500cc, D5NS @75cc/h 27yo F PMH anorexia (NGT dependant, used to have PEG tube), OCD who presents here after ng tube dislodged 2 days ago. Pt reports feeling dry. Reports nasogastric tube fell out. Sometime her stomach bothers her a little but not currently. Does not have regular bowel movements, she's not sure when last one was; at baseline she might have one per week; urinates twice per day. Reports she pulled the NG tube out when she was crying about something; does not elaborate. Denies thoughts of hurting herself. Denies fever, chills, CP, palpitations, diarrhea, dysuria.  Pt defers most questions to mother, however very little information given.      ED course:   P/w temp 94.5 axillary (pt refused oral/rectal), -->repeat normal  labs: wbc 3.79, bicarb 16, AG 26, glucose 62, lactate 1.2, VBG pH 7.27  ecg: regular, sinus, QTc 495  Interventions: folic acid 1mg IV, thiamine 100mg IV, NS 500cc, D5NS @75cc/h

## 2023-08-20 NOTE — H&P ADULT - NSHPLABSRESULTS_GEN_ALL_CORE
.  LABS:                         13.8   3.79  )-----------( 293      ( 20 Aug 2023 20:10 )             42.5     08-20    142  |  98  |  21  ----------------------------<  62<L>  4.0   |  18<L>  |  0.81    Ca    10.2      20 Aug 2023 20:10  Phos  3.9     08-20  Mg     2.2     08-20    TPro  8.0  /  Alb  4.7  /  TBili  0.4  /  DBili  x   /  AST  21  /  ALT  9<L>  /  AlkPhos  74  08-20      Urinalysis Basic - ( 20 Aug 2023 20:10 )    Color: x / Appearance: x / SG: x / pH: x  Gluc: 62 mg/dL / Ketone: x  / Bili: x / Urobili: x   Blood: x / Protein: x / Nitrite: x   Leuk Esterase: x / RBC: x / WBC x   Sq Epi: x / Non Sq Epi: x / Bacteria: x    Lactate, Blood: 1.2 mmol/L (08-20 @ 21:22)      RADIOLOGY, EKG & ADDITIONAL TESTS: Reviewed.

## 2023-08-20 NOTE — ED PROVIDER NOTE - INPATIENT RESIDENT/ACP NOTIFIED DATE
Wound Care Progress Note    Subjective:       Patient ID: Richard Bustos is a 73 y.o. male.    Chief Complaint: Shortness of Breath    HPI  Richard Bustos is a 73 y.o. male presenting with multiple open wounds on his arms, legs, sacrum and neck. Pt presented to the ED from [place] for acute encephalopathy beyond his baseline dementia. On admission he was found to have multiple wounds of the BL arms, BLE, posterior neck and sacrum. Sacrum wound is necrotic and dry. Pt was unable lionel assist with his history due to his condition. Surgery debrided the sacral wound and it is now packed as he is getting a diverting colostomy today. Pt family is by his side for the visit, and they do not have any new concerns and are very much in favor of the colostomy for wound care.   Review of Systems   Skin:  Positive for wound.        Multiple open wounds of the arms, legs and sacrum   All other systems reviewed and are negative.      Objective:        Physical Exam  Vitals and nursing note reviewed. Exam conducted with a chaperone present.   Constitutional:       General: He is not in acute distress.     Appearance: He is ill-appearing. He is not toxic-appearing or diaphoretic.   HENT:      Head:      Comments: Multiple bruises on the face and forehead  Skin:     General: Skin is warm and dry.      Coloration: Skin is not jaundiced or pale.      Findings: Bruising and lesion present. No erythema or rash.      Comments: Sacral stage 4 pressure ulcer, debrided in surgery, BL legs open wounds, arm wounds.    Neurological:      General: No focal deficit present.      Mental Status: He is alert and oriented to person, place, and time.   Psychiatric:         Mood and Affect: Mood normal.         Behavior: Behavior normal.       Vitals:    04/25/23 2008   BP: 129/62   Pulse: 67   Resp: 19   Temp: 97.9 °F (36.6 °C)       Assessment:           ICD-10-CM ICD-9-CM   1. Pressure injury of sacral region, stage 4  L89.154 707.03      707.24   2. Septic shock  A41.9 038.9    R65.21 785.52     995.92   3. EMMY (acute kidney injury) [N17.9 (ICD-10-CM)]  N17.9 584.9   4. Dementia without behavioral disturbance [F03.90 (ICD-10-CM)]  F03.90 294.20   5. Proteus infection [A49.8 (ICD-10-CM)]  A49.8 041.6            Altered Skin Integrity 03/21/23 0412 Right anterior;lower;proximal Leg #5 Skin Tear (Active)   03/21/23 0412   Altered Skin Integrity Present on Admission - Did Patient arrive to the hospital with altered skin?: yes   Side: Right   Orientation: anterior;lower;proximal   Location: Leg   Wound Number: #5   Is this injury device related?:    Primary Wound Type: Skin tear   Description of Altered Skin Integrity:    Ankle-Brachial Index:    Pulses:    Removal Indication and Assessment:    Wound Outcome:    (Retired) Wound Length (cm):    (Retired) Wound Width (cm):    (Retired) Depth (cm):    Wound Description (Comments):    Removal Indications:    Description of Altered Skin Integrity Partial thickness tissue loss. Shallow open ulcer with a red or pink wound bed, without slough. Intact or Open/Ruptured Serum-filled blister. 04/20/23 0701   Dressing Appearance Dry;Clean;Intact 04/20/23 0701   Drainage Amount None 04/20/23 0701   Drainage Characteristics/Odor Serosanguineous 04/19/23 0701   Appearance Dressing in place, unable to visualize 04/22/23 0701   Dressing Reinforced 04/19/23 0101            Altered Skin Integrity 04/01/23 0145 Right anterior;lower Leg Non pressure chronic ulcer Partial thickness tissue loss. Shallow open ulcer with a red or pink wound bed, without slough. Intact or Open/Ruptured Serum-filled blister. (Active)   04/01/23 0145   Altered Skin Integrity Present on Admission - Did Patient arrive to the hospital with altered skin?: yes   Side: Right   Orientation: anterior;lower   Location: Leg   Wound Number:    Is this injury device related?:    Primary Wound Type: Non pressure   Description of Altered Skin Integrity: Partial  thickness tissue loss. Shallow open ulcer with a red or pink wound bed, without slough. Intact or Open/Ruptured Serum-filled blister.   Ankle-Brachial Index:    Pulses:    Removal Indication and Assessment:    Wound Outcome:    (Retired) Wound Length (cm):    (Retired) Wound Width (cm):    (Retired) Depth (cm):    Wound Description (Comments):    Removal Indications:    Dressing Appearance Clean;Dry;Intact 04/23/23 1903   Drainage Amount None 04/23/23 1903   Appearance Dressing in place, unable to visualize 04/23/23 1903   Dressing Reinforced 04/19/23 0701            Altered Skin Integrity 04/01/23 0145 Right anterior Knee Skin Tear (Active)   04/01/23 0145   Altered Skin Integrity Present on Admission - Did Patient arrive to the hospital with altered skin?: yes   Side: Right   Orientation: anterior   Location: Knee   Wound Number:    Is this injury device related?:    Primary Wound Type: Skin tear   Description of Altered Skin Integrity:    Ankle-Brachial Index:    Pulses:    Removal Indication and Assessment:    Wound Outcome:    (Retired) Wound Length (cm):    (Retired) Wound Width (cm):    (Retired) Depth (cm):    Wound Description (Comments):    Removal Indications:    Description of Altered Skin Integrity Partial thickness tissue loss. Shallow open ulcer with a red or pink wound bed, without slough. Intact or Open/Ruptured Serum-filled blister. 04/22/23 0701   Dressing Appearance Open to air 04/19/23 1940   Drainage Amount None 04/19/23 1940   Appearance Pink 04/19/23 0701   Tissue loss description Partial thickness 04/20/23 0701   Dressing Reinforced 04/19/23 0701            Altered Skin Integrity 04/01/23 0146 Left Arm Skin Tear (Active)   04/01/23 0146   Altered Skin Integrity Present on Admission - Did Patient arrive to the hospital with altered skin?: yes   Side: Left   Orientation:    Location: Arm   Wound Number:    Is this injury device related?:    Primary Wound Type: Skin tear   Description of  Altered Skin Integrity:    Ankle-Brachial Index:    Pulses:    Removal Indication and Assessment:    Wound Outcome:    (Retired) Wound Length (cm):    (Retired) Wound Width (cm):    (Retired) Depth (cm):    Wound Description (Comments):    Removal Indications:    Description of Altered Skin Integrity Partial thickness tissue loss. Shallow open ulcer with a red or pink wound bed, without slough. Intact or Open/Ruptured Serum-filled blister. 04/20/23 0701   Dressing Appearance Clean;Dry;Intact 04/23/23 1903   Drainage Amount None 04/20/23 0701   Drainage Characteristics/Odor Serosanguineous 04/19/23 0701   Appearance Dressing in place, unable to visualize 04/23/23 1903   Tissue loss description Partial thickness 04/22/23 0701   Periwound Area Redness 04/19/23 0701   Wound Edges Irregular 04/19/23 0701   Care Cleansed with:;Wound cleanser 04/19/23 0701   Dressing Foam;Reinforced 04/19/23 0701            Altered Skin Integrity 04/01/23 0153 Left Hand Abrasion(s) (Active)   04/01/23 0153   Altered Skin Integrity Present on Admission - Did Patient arrive to the hospital with altered skin?: yes   Side: Left   Orientation:    Location: Hand   Wound Number:    Is this injury device related?:    Primary Wound Type: Abrasion(s)   Description of Altered Skin Integrity:    Ankle-Brachial Index:    Pulses:    Removal Indication and Assessment:    Wound Outcome:    (Retired) Wound Length (cm):    (Retired) Wound Width (cm):    (Retired) Depth (cm):    Wound Description (Comments):    Removal Indications:    Description of Altered Skin Integrity Partial thickness tissue loss. Shallow open ulcer with a red or pink wound bed, without slough. Intact or Open/Ruptured Serum-filled blister. 04/22/23 0701   Dressing Appearance Clean;Dry;Intact 04/20/23 0701   Drainage Amount None 04/20/23 0701   Appearance Dressing in place, unable to visualize 04/20/23 0701   Tissue loss description Not applicable 04/20/23 0701   Dressing  Reinforced;Gauze 04/19/23 0701            Altered Skin Integrity 04/01/23 0154 Right Nose Abrasion(s) (Active)   04/01/23 0154   Altered Skin Integrity Present on Admission - Did Patient arrive to the hospital with altered skin?: yes   Side: Right   Orientation:    Location: Nose   Wound Number:    Is this injury device related?:    Primary Wound Type: Abrasion(s)   Description of Altered Skin Integrity:    Ankle-Brachial Index:    Pulses:    Removal Indication and Assessment:    Wound Outcome:    (Retired) Wound Length (cm):    (Retired) Wound Width (cm):    (Retired) Depth (cm):    Wound Description (Comments):    Removal Indications:    Description of Altered Skin Integrity Partial thickness tissue loss. Shallow open ulcer with a red or pink wound bed, without slough. Intact or Open/Ruptured Serum-filled blister. 04/22/23 0701   Dressing Appearance Open to air 04/20/23 0701   Drainage Amount None 04/19/23 1940   Appearance Fibrin 04/19/23 0701   Tissue loss description Not applicable 04/19/23 0701   Dressing Reinforced 04/19/23 0701            Altered Skin Integrity 03/31/23 Sacral spine Ulceration Full thickness tissue loss. Base is covered by slough and/or eschar in the wound bed (Active)   03/31/23    Altered Skin Integrity Present on Admission - Did Patient arrive to the hospital with altered skin?: yes   Side:    Orientation:    Location: Sacral spine   Wound Number:    Is this injury device related?:    Primary Wound Type: Ulceration   Description of Altered Skin Integrity: Full thickness tissue loss. Base is covered by slough and/or eschar in the wound bed   Ankle-Brachial Index:    Pulses:    Removal Indication and Assessment:    Wound Outcome:    (Retired) Wound Length (cm):    (Retired) Wound Width (cm):    (Retired) Depth (cm):    Wound Description (Comments):    Removal Indications:    Dressing Appearance Dry;Intact;Clean 04/24/23 2006   Drainage Amount None 04/24/23 2006   Appearance Dressing in  place, unable to visualize 04/24/23 2006   Dressing Reinforced;Foam 04/19/23 0701            Altered Skin Integrity 03/21/23 Right anterior;medial Toe, first Non pressure chronic ulcer Partial thickness tissue loss. Shallow open ulcer with a red or pink wound bed, without slough. Intact or Open/Ruptured Serum-filled blister. (Active)   03/21/23    Altered Skin Integrity Present on Admission - Did Patient arrive to the hospital with altered skin?: yes   Side: Right   Orientation: anterior;medial   Location: Toe, first   Wound Number:    Is this injury device related?: No   Primary Wound Type: Non pressure   Description of Altered Skin Integrity: Partial thickness tissue loss. Shallow open ulcer with a red or pink wound bed, without slough. Intact or Open/Ruptured Serum-filled blister.   Ankle-Brachial Index:    Pulses:    Removal Indication and Assessment:    Wound Outcome:    (Retired) Wound Length (cm):    (Retired) Wound Width (cm):    (Retired) Depth (cm):    Wound Description (Comments):    Removal Indications:    Description of Altered Skin Integrity Full thickness tissue loss. Subcutaneous fat may be visible but bone, tendon or muscle are not exposed 04/22/23 0701   Dressing Appearance Dry;Intact;Clean 04/24/23 2006   Drainage Amount None 04/24/23 2006   Appearance Dressing in place, unable to visualize 04/24/23 2006   Tissue loss description Full thickness 04/20/23 0701   Dressing Reinforced;Gauze 04/19/23 0101   Off Loading Football dressing 04/20/23 0701            Altered Skin Integrity 04/04/23 Right anterior;dorsal Foot Skin Tear Partial thickness tissue loss. Shallow open ulcer with a red or pink wound bed, without slough. Intact or Open/Ruptured Serum-filled blister. (Active)   04/04/23    Altered Skin Integrity Present on Admission - Did Patient arrive to the hospital with altered skin?: yes   Side: Right   Orientation: anterior;dorsal   Location: Foot   Wound Number:    Is this injury device  related?:    Primary Wound Type: Skin tear   Description of Altered Skin Integrity: Partial thickness tissue loss. Shallow open ulcer with a red or pink wound bed, without slough. Intact or Open/Ruptured Serum-filled blister.   Ankle-Brachial Index:    Pulses:    Removal Indication and Assessment:    Wound Outcome:    (Retired) Wound Length (cm):    (Retired) Wound Width (cm):    (Retired) Depth (cm):    Wound Description (Comments):    Removal Indications:    Description of Altered Skin Integrity Partial thickness tissue loss. Shallow open ulcer with a red or pink wound bed, without slough. Intact or Open/Ruptured Serum-filled blister. 04/20/23 0701   Dressing Appearance Dry;Intact;Clean 04/24/23 2006   Drainage Amount None 04/24/23 2006   Drainage Characteristics/Odor Serosanguineous 04/19/23 0701   Appearance Dressing in place, unable to visualize 04/24/23 2006   Tissue loss description Partial thickness 04/19/23 0701   Dressing Reinforced 04/19/23 0101            Altered Skin Integrity 04/18/23 1600 Left anterior;lower;medial Leg Non pressure chronic ulcer Partial thickness tissue loss. Shallow open ulcer with a red or pink wound bed, without slough. Intact or Open/Ruptured Serum-filled blister. (Active)   04/18/23 1600   Altered Skin Integrity Present on Admission - Did Patient arrive to the hospital with altered skin?: yes   Side: Left   Orientation: anterior;lower;medial   Location: Leg   Wound Number:    Is this injury device related?:    Primary Wound Type: Non pressure   Description of Altered Skin Integrity: Partial thickness tissue loss. Shallow open ulcer with a red or pink wound bed, without slough. Intact or Open/Ruptured Serum-filled blister.   Ankle-Brachial Index:    Pulses:    Removal Indication and Assessment:    Wound Outcome:    (Retired) Wound Length (cm):    (Retired) Wound Width (cm):    (Retired) Depth (cm):    Wound Description (Comments):    Removal Indications:    Dressing Appearance  Dry;Intact;Clean 04/25/23 1502   Drainage Amount None 04/25/23 1502   Appearance Dressing in place, unable to visualize 04/25/23 1502   Dressing Reinforced 04/20/23 0701            Altered Skin Integrity 04/18/23 1600 Left anterior;lateral Foot Ulceration Full thickness tissue loss. Base is covered by slough and/or eschar in the wound bed (Active)   04/18/23 1600   Altered Skin Integrity Present on Admission - Did Patient arrive to the hospital with altered skin?: yes   Side: Left   Orientation: anterior;lateral   Location: Foot   Wound Number:    Is this injury device related?:    Primary Wound Type: Ulceration   Description of Altered Skin Integrity: Full thickness tissue loss. Base is covered by slough and/or eschar in the wound bed   Ankle-Brachial Index:    Pulses:    Removal Indication and Assessment:    Wound Outcome:    (Retired) Wound Length (cm):    (Retired) Wound Width (cm):    (Retired) Depth (cm):    Wound Description (Comments):    Removal Indications:    Description of Altered Skin Integrity Full thickness tissue loss. Subcutaneous fat may be visible but bone, tendon or muscle are not exposed 04/22/23 0701   Dressing Appearance Dry;Intact;Clean 04/25/23 1502   Drainage Amount None 04/25/23 1502   Drainage Characteristics/Odor Serosanguineous 04/24/23 2006   Appearance Dressing in place, unable to visualize 04/25/23 1502   Tissue loss description Full thickness 04/19/23 0701   Black (%), Wound Tissue Color 0 % 04/19/23 0701   Red (%), Wound Tissue Color 0 % 04/19/23 0701   Yellow (%), Wound Tissue Color 100 % 04/19/23 0701   Periwound Area Pale white 04/19/23 0701   Dressing Foam;Reinforced 04/19/23 0701            Altered Skin Integrity 04/18/23 1600 Right distal Toe, first Ulceration Full thickness tissue loss. Base is covered by slough and/or eschar in the wound bed (Active)   04/18/23 1600   Altered Skin Integrity Present on Admission - Did Patient arrive to the hospital with altered skin?: yes    Side: Right   Orientation: distal   Location: Toe, first   Wound Number:    Is this injury device related?:    Primary Wound Type: Ulceration   Description of Altered Skin Integrity: Full thickness tissue loss. Base is covered by slough and/or eschar in the wound bed   Ankle-Brachial Index:    Pulses:    Removal Indication and Assessment:    Wound Outcome:    (Retired) Wound Length (cm):    (Retired) Wound Width (cm):    (Retired) Depth (cm):    Wound Description (Comments):    Removal Indications:    Description of Altered Skin Integrity Full thickness tissue loss. Subcutaneous fat may be visible but bone, tendon or muscle are not exposed 04/22/23 0701   Dressing Appearance Dry;Intact;Clean 04/25/23 1502   Drainage Amount None 04/25/23 1502   Appearance Dressing in place, unable to visualize 04/25/23 1502   Tissue loss description Full thickness 04/20/23 0701   Dressing Reinforced;Gauze 04/19/23 0701            Altered Skin Integrity 04/18/23 1600 Right posterior Calf Ulceration Full thickness tissue loss. Base is covered by slough and/or eschar in the wound bed (Active)   04/18/23 1600   Altered Skin Integrity Present on Admission - Did Patient arrive to the hospital with altered skin?: yes   Side: Right   Orientation: posterior   Location: Calf   Wound Number:    Is this injury device related?:    Primary Wound Type: Ulceration   Description of Altered Skin Integrity: Full thickness tissue loss. Base is covered by slough and/or eschar in the wound bed   Ankle-Brachial Index:    Pulses:    Removal Indication and Assessment:    Wound Outcome:    (Retired) Wound Length (cm):    (Retired) Wound Width (cm):    (Retired) Depth (cm):    Wound Description (Comments):    Removal Indications:    Description of Altered Skin Integrity Full thickness tissue loss. Subcutaneous fat may be visible but bone, tendon or muscle are not exposed 04/22/23 0701   Dressing Appearance Dry;Intact;Clean 04/24/23 2006   Drainage Amount  None 04/23/23 1903   Appearance Dressing in place, unable to visualize 04/24/23 2006   Dressing Reinforced 04/19/23 0701            Altered Skin Integrity 04/18/23 1600 Right posterior Hand Abrasion(s) Partial thickness tissue loss. Shallow open ulcer with a red or pink wound bed, without slough. Intact or Open/Ruptured Serum-filled blister. (Active)   04/18/23 1600   Altered Skin Integrity Present on Admission - Did Patient arrive to the hospital with altered skin?: yes   Side: Right   Orientation: posterior   Location: Hand   Wound Number:    Is this injury device related?:    Primary Wound Type: Abrasion(s)   Description of Altered Skin Integrity: Partial thickness tissue loss. Shallow open ulcer with a red or pink wound bed, without slough. Intact or Open/Ruptured Serum-filled blister.   Ankle-Brachial Index:    Pulses:    Removal Indication and Assessment:    Wound Outcome:    (Retired) Wound Length (cm):    (Retired) Wound Width (cm):    (Retired) Depth (cm):    Wound Description (Comments):    Removal Indications:    Description of Altered Skin Integrity Partial thickness tissue loss. Shallow open ulcer with a red or pink wound bed, without slough. Intact or Open/Ruptured Serum-filled blister. 04/22/23 0701   Dressing Appearance Clean;Dry;Intact 04/23/23 1903   Drainage Amount None 04/23/23 1903   Appearance Dressing in place, unable to visualize 04/23/23 1903   Tissue loss description Partial thickness 04/20/23 0701   Care Cleansed with:;Wound cleanser 04/19/23 0701   Dressing Reinforced 04/19/23 0701            Altered Skin Integrity 04/19/23 0101 posterior Scrotum Intact skin with non-blanchable redness of localized area (Active)   04/19/23 0101   Altered Skin Integrity Present on Admission - Did Patient arrive to the hospital with altered skin?: yes   Side:    Orientation: posterior   Location: Scrotum   Wound Number:    Is this injury device related?:    Primary Wound Type:    Description of Altered Skin  Integrity: Intact skin with non-blanchable redness of localized area   Ankle-Brachial Index:    Pulses:    Removal Indication and Assessment:    Wound Outcome:    (Retired) Wound Length (cm):    (Retired) Wound Width (cm):    (Retired) Depth (cm):    Wound Description (Comments):    Removal Indications:    Description of Altered Skin Integrity Intact skin with non-blanchable redness of localized area 04/22/23 0701   Dressing Appearance Dry;Intact;Clean 04/25/23 1502   Drainage Amount None 04/25/23 1502   Appearance Dressing in place, unable to visualize 04/25/23 1502   Care Cleansed with:;Soap and water 04/22/23 0701            Incision/Site 04/20/23 1902 Back (Active)   04/20/23 1902   Present Prior to Hospital Arrival?:    Side:    Location: Back   Orientation:    Incision Type:    Closure Method:    Additional Comments:    Removal Indication and Assessment:    Wound Outcome:    Removal Indications:    Incision WDL ex 04/21/23 0930   Dressing Appearance Dried drainage 04/21/23 0930   Drainage Amount Small 04/22/23 0701   Drainage Characteristics/Odor Serosanguineous 04/24/23 2006   Periwound Area Denuded;Moist 04/21/23 0930   Wound Edges Defined 04/21/23 0930   Care Cleansed with:;Povidone iodine 04/21/23 0930   Dressing Gauze 04/22/23 0701   Packing packed with;other (see comment) 04/21/23 0930            Incision/Site 04/25/23 1421 Abdomen (Active)   04/25/23 1421   Present Prior to Hospital Arrival?:    Side:    Location: Abdomen   Orientation:    Incision Type:    Closure Method:    Additional Comments:    Removal Indication and Assessment:    Wound Outcome:    Removal Indications:    Incision WDL WDL 04/25/23 1502   Dressing Appearance Open to air;other (see comments) 04/25/23 1502   Drainage Amount None 04/25/23 1502       [REMOVED]      Negative Pressure Wound Therapy  04/21/23 1600 (Removed)   04/21/23 1600   Side:    Orientation:    Location: Sacral Spine   Additional Comments:    Removal Indication and  Assessment:    Location:    SDO Location:    Removed 04/25/23    NPWT Type Vacuum Therapy 04/24/23 2006   Therapy Setting NPWT Continuous therapy 04/24/23 2006   Pressure Setting NPWT 125 mmHg 04/24/23 2006   Therapy Interventions NPWT Seal intact 04/24/23 2006   Sponges Inserted NPWT White;1;Other 04/21/23 1901     X-Ray Cervical Spine 2 or 3 Views  Order: [id number]  Status: Final result     Visible to patient: Yes (seen)     Next appt: 04/24/2023 at 10:45 AM in Dermatology (Alana Brown MD)     0 Result Notes  Details    Reading Physician Reading Date Result Priority   Erick Tenorio MD  [phone] 4/19/2023      Narrative & Impression  Reason: open surgical wound     FINDINGS:     AP and lateral views of cervical spine. Limited lateral views due to overlying shoulder shadows. C3-C7 bilateral spinal fixation hardware noted and appears intact. No definite fracture or destructive osseous lesion observed. There is intervertebral disc height loss of C2-3, C3-4 and C4-5 with limited evaluation. Visualized paravertebral soft tissues are unremarkable.        IMPRESSION:     1.  C3-C7 spinal fixation hardware appears intact.  2.  Intervertebral disc height loss at C2-3, C3-4 and C4-5.  3.  Limited evaluation of the cervical spine on lateral view.     Electronically signed by:  Erick Tenorio DO  4/19/2023 4:00 PM CDT Workstation: [id number]           Specimen Collected: 04/19/23 15:41               X-Ray Lumbar Spine 2 Or 3 Views  Order: [id number]  Status: Final result     Visible to patient: Yes (seen)     Next appt: 04/24/2023 at 10:45 AM in Dermatology (Alana Brown MD)     0 Result Notes  Details    Reading Physician Reading Date Result Priority   Erick Tenorio MD  [phone] 4/19/2023      Narrative & Impression  Reason: open wound     FINDINGS:     AP and limited lateral views of lumbar  spine show normal alignment. No fracture or destructive osseous lesion. There is diffuse intervertebral disc  height loss throughout the lumbar spine with associated vertebral body marginal osteophytes. IVC filter is noted.     Paraspinal soft tissues are grossly normal.     IMPRESSION:     Diffuse discogenic degenerative changes of the lumbar spine.     Electronically signed by:  Erick Del Realchin CARY  4/19/2023 3:55 PM CDT Workstation: [id number]           Specimen Collected: 04/19/23 15:12           Contains abnormal data Aerobic culture  Order: [id number]  Status: Final result     Visible to patient: Yes (not seen)     Next appt: 05/04/2023 at 11:30 AM in Family Medicine ([facility] Ramirez Jr, MD)     Dx: Pressure injury of sacral region, sta...     Specimen Information: Sacrum; Wound   0 Result Notes      Component 5 d ago   Aerobic Bacterial Culture  Abnormal   MORGANELLA MORGANII   Few     Resulting Agency [facility]        Susceptibility     Morganella morganii     CULTURE, AEROBIC  (SPECIFY SOURCE)     Amp/Sulbactam >16/8 mcg/mL Resistant     Cefepime <=2 mcg/mL Sensitive     Ceftriaxone <=1 mcg/mL Sensitive     Ciprofloxacin <=1 mcg/mL Sensitive     Ertapenem <=0.5 mcg/mL Sensitive     Gentamicin <=4 mcg/mL Sensitive     Levofloxacin <=2 mcg/mL Sensitive     Meropenem <=1 mcg/mL Sensitive     Piperacillin/Tazo <=16 mcg/mL Sensitive     Tobramycin <=4 mcg/mL Sensitive     Trimeth/Sulfa <=2/38 mcg/mL Sensitive               Linear View      Narrative  Performed by: ANGEL  Sacral Wound      Specimen Collected: 04/20/23 19:01           Plan:               Follow-up Information       Glenroy Eddy DO Follow up in 1 week(s).    Specialty: Wound Care  Why: For wound re-check  Contact information:  [address]  [facility] [address]  [phone]                           Assessment and Recommendations         Diagnoses:    1. Unstagable sacral pressure ulcer with DTI  2 Left arm skin tear  3. Left lateral foot un-stagable pressure ulcer  4. Right great toe un-stagable pressure ulcer/DM ulcer  5. Right foot open wound with  eschar  6. Right leg open wound  7. Multiple open wounds of the left leg  8. Posterior neck un-stagable pressure ulcer  9. BLE multiple open wounds     Plan:  Aerobic culture of the sacrum + for morganella  Anaerobic culture of the sacrum pending but negative  Lumbar xray reviewed  Cervical xray reviewed  Pack Sacral wound per surgery, will apply a wound vac after the colostomy  Open wounds of the legs xeroform + foam dressing  Arm wound medihoney daily  8. FU at the wound clinic on DC                  20-Aug-2023 21:58

## 2023-08-20 NOTE — H&P ADULT - PROBLEM SELECTOR PLAN 6
F: D5NS  E: replenish as appropriate  N: NPO for PEG tube procedure    VTE Prophylaxis: SCDs  GI: protonix  C: Full Code  D: F

## 2023-08-20 NOTE — ED PROVIDER NOTE - MUSCULOSKELETAL, MLM
Spine appears normal, range of motion is not limited, no muscle or joint tenderness. extremities thin

## 2023-08-20 NOTE — ED ADULT NURSE NOTE - OBJECTIVE STATEMENT
Pt presented to ed for NG tube placement, as per pt mother at the bed side pt has h/o anorexia and does not take any food orally and she is been getting feeding through G tubes in the past that's been replaced by NG tube. Pt accidentally removed the NG tube and was unable to have any thing PO since last friday. Pt on assessment is awake, alert and oriented, denies pain, n/v, difficulty breathing, remains comfortable in the ed, pt is not in any acute distress at this time.

## 2023-08-20 NOTE — ED PROVIDER NOTE - OBJECTIVE STATEMENT
history of anorexia, reportedly doesn't take anything by mouth, is feeding tube dependant, here after ng tube came out 2 days ago. Reports has had it in for about a year. Prior had peg tube. Came out when she got upset and it was pulled. Hasn't taken any po or meds since it came out. Here today to see Dr Buckley about replacing peg tube. Denies fever, chills, pain. Feels some mild nausea. Last bm about 2 weeks ago.

## 2023-08-20 NOTE — ED ADULT TRIAGE NOTE - CHIEF COMPLAINT QUOTE
as per EMS and parents patient is here to have NG tube replaced that fell out on friday. As per EMS and parents patient has anorexia, has not been eating, patient also does not want to take oral temperature, only agreeable to axillary temperature. Patient is wearing gloves and refuses to take off gloves for O2 sat monitoring and wants it to be done on her toes only, patient was not weighted.

## 2023-08-20 NOTE — H&P ADULT - PROBLEM SELECTOR PLAN 2
AG elevated, bicarb 18. Lactate neg. BHB 6.7.  Pt has not had any nutrition for 2 days. No alcohol ingestions. No tylenol use.   Likely due to a starvation ketoacidosis.    - check serum osm  - check BMP in AM  - fluids as above

## 2023-08-20 NOTE — ED PROVIDER NOTE - CONSTITUTIONAL, MLM
very thin/ pale appearing, awake, alert, oriented to person, place, time/situation and in no apparent distress. normal...

## 2023-08-20 NOTE — H&P ADULT - ASSESSMENT
27yo F PMH anorexia (NGT dependant, used to have PEG tube), OCD who presents here after ng tube dislodged 2 days ago. Admit to [facility] for PEG tube placement with Dr. Buckley.

## 2023-08-21 LAB
ANION GAP SERPL CALC-SCNC: 16 MMOL/L — SIGNIFICANT CHANGE UP (ref 5–17)
ANION GAP SERPL CALC-SCNC: 19 MMOL/L — HIGH (ref 5–17)
APTT BLD: 23.9 SEC — LOW (ref 24.5–35.6)
BUN SERPL-MCNC: 14 MG/DL — SIGNIFICANT CHANGE UP (ref 7–23)
BUN SERPL-MCNC: 19 MG/DL — SIGNIFICANT CHANGE UP (ref 7–23)
CALCIUM SERPL-MCNC: 9.1 MG/DL — SIGNIFICANT CHANGE UP (ref 8.4–10.5)
CALCIUM SERPL-MCNC: 9.1 MG/DL — SIGNIFICANT CHANGE UP (ref 8.4–10.5)
CHLORIDE SERPL-SCNC: 105 MMOL/L — SIGNIFICANT CHANGE UP (ref 96–108)
CHLORIDE SERPL-SCNC: 107 MMOL/L — SIGNIFICANT CHANGE UP (ref 96–108)
CO2 SERPL-SCNC: 18 MMOL/L — LOW (ref 22–31)
CO2 SERPL-SCNC: 19 MMOL/L — LOW (ref 22–31)
CREAT SERPL-MCNC: 0.68 MG/DL — SIGNIFICANT CHANGE UP (ref 0.5–1.3)
CREAT SERPL-MCNC: 0.72 MG/DL — SIGNIFICANT CHANGE UP (ref 0.5–1.3)
EGFR: 115 ML/MIN/1.73M2 — SIGNIFICANT CHANGE UP
EGFR: 120 ML/MIN/1.73M2 — SIGNIFICANT CHANGE UP
GLUCOSE BLDC GLUCOMTR-MCNC: 91 MG/DL — SIGNIFICANT CHANGE UP (ref 70–99)
GLUCOSE SERPL-MCNC: 63 MG/DL — LOW (ref 70–99)
GLUCOSE SERPL-MCNC: 66 MG/DL — LOW (ref 70–99)
HCT VFR BLD CALC: 38 % — SIGNIFICANT CHANGE UP (ref 34.5–45)
HGB BLD-MCNC: 12.3 G/DL — SIGNIFICANT CHANGE UP (ref 11.5–15.5)
INR BLD: 0.86 — SIGNIFICANT CHANGE UP (ref 0.85–1.18)
MAGNESIUM SERPL-MCNC: 2 MG/DL — SIGNIFICANT CHANGE UP (ref 1.6–2.6)
MCHC RBC-ENTMCNC: 30.9 PG — SIGNIFICANT CHANGE UP (ref 27–34)
MCHC RBC-ENTMCNC: 32.4 GM/DL — SIGNIFICANT CHANGE UP (ref 32–36)
MCV RBC AUTO: 95.5 FL — SIGNIFICANT CHANGE UP (ref 80–100)
NRBC # BLD: 0 /100 WBCS — SIGNIFICANT CHANGE UP (ref 0–0)
OSMOLALITY SERPL: 296 MOSM/KG — SIGNIFICANT CHANGE UP (ref 275–300)
PHOSPHATE SERPL-MCNC: 3.5 MG/DL — SIGNIFICANT CHANGE UP (ref 2.5–4.5)
PLATELET # BLD AUTO: 267 K/UL — SIGNIFICANT CHANGE UP (ref 150–400)
POTASSIUM SERPL-MCNC: 4 MMOL/L — SIGNIFICANT CHANGE UP (ref 3.5–5.3)
POTASSIUM SERPL-MCNC: 4.2 MMOL/L — SIGNIFICANT CHANGE UP (ref 3.5–5.3)
POTASSIUM SERPL-SCNC: 4 MMOL/L — SIGNIFICANT CHANGE UP (ref 3.5–5.3)
POTASSIUM SERPL-SCNC: 4.2 MMOL/L — SIGNIFICANT CHANGE UP (ref 3.5–5.3)
PROTHROM AB SERPL-ACNC: 9.9 SEC — SIGNIFICANT CHANGE UP (ref 9.5–13)
RBC # BLD: 3.98 M/UL — SIGNIFICANT CHANGE UP (ref 3.8–5.2)
RBC # FLD: 13.2 % — SIGNIFICANT CHANGE UP (ref 10.3–14.5)
SODIUM SERPL-SCNC: 140 MMOL/L — SIGNIFICANT CHANGE UP (ref 135–145)
SODIUM SERPL-SCNC: 144 MMOL/L — SIGNIFICANT CHANGE UP (ref 135–145)
WBC # BLD: 4.02 K/UL — SIGNIFICANT CHANGE UP (ref 3.8–10.5)
WBC # FLD AUTO: 4.02 K/UL — SIGNIFICANT CHANGE UP (ref 3.8–10.5)

## 2023-08-21 RX ORDER — SODIUM CHLORIDE 9 MG/ML
1000 INJECTION, SOLUTION INTRAVENOUS
Refills: 0 | Status: DISCONTINUED | OUTPATIENT
Start: 2023-08-21 | End: 2023-08-22

## 2023-08-21 RX ADMIN — Medication 1 MILLIGRAM(S): at 21:16

## 2023-08-21 RX ADMIN — Medication 100 MILLIGRAM(S): at 21:22

## 2023-08-21 RX ADMIN — SODIUM CHLORIDE 125 MILLILITER(S): 9 INJECTION, SOLUTION INTRAVENOUS at 23:16

## 2023-08-21 RX ADMIN — SODIUM CHLORIDE 75 MILLILITER(S): 9 INJECTION, SOLUTION INTRAVENOUS at 03:15

## 2023-08-21 RX ADMIN — PANTOPRAZOLE SODIUM 40 MILLIGRAM(S): 20 TABLET, DELAYED RELEASE ORAL at 11:15

## 2023-08-21 RX ADMIN — SODIUM CHLORIDE 75 MILLILITER(S): 9 INJECTION, SOLUTION INTRAVENOUS at 19:13

## 2023-08-21 NOTE — PROGRESS NOTE ADULT - PROBLEM SELECTOR PLAN 1
Longstanding hx. Used to have PEG tube in past. Has had NGT for last 1yr. Pt pulled out NGT 2 days prior during emotional event but no SI or thoughts of hurting herself. Pt does not typically take anything by mouth at all.  - D5 fluids standing  - thiamine and folic acid IVP  - plan for PEG tube tomorrow

## 2023-08-22 ENCOUNTER — TRANSCRIPTION ENCOUNTER (OUTPATIENT)
Age: 28
End: 2023-08-22

## 2023-08-22 LAB
ANION GAP SERPL CALC-SCNC: 18 MMOL/L — HIGH (ref 5–17)
BUN SERPL-MCNC: 14 MG/DL — SIGNIFICANT CHANGE UP (ref 7–23)
CALCIUM SERPL-MCNC: 9.2 MG/DL — SIGNIFICANT CHANGE UP (ref 8.4–10.5)
CHLORIDE SERPL-SCNC: 105 MMOL/L — SIGNIFICANT CHANGE UP (ref 96–108)
CO2 SERPL-SCNC: 16 MMOL/L — LOW (ref 22–31)
CREAT SERPL-MCNC: 0.66 MG/DL — SIGNIFICANT CHANGE UP (ref 0.5–1.3)
EGFR: 121 ML/MIN/1.73M2 — SIGNIFICANT CHANGE UP
GLUCOSE SERPL-MCNC: 53 MG/DL — CRITICAL LOW (ref 70–99)
MAGNESIUM SERPL-MCNC: 2 MG/DL — SIGNIFICANT CHANGE UP (ref 1.6–2.6)
PHOSPHATE SERPL-MCNC: 2.9 MG/DL — SIGNIFICANT CHANGE UP (ref 2.5–4.5)
POTASSIUM SERPL-MCNC: SIGNIFICANT CHANGE UP (ref 3.5–5.3)
POTASSIUM SERPL-SCNC: SIGNIFICANT CHANGE UP (ref 3.5–5.3)
SODIUM SERPL-SCNC: 139 MMOL/L — SIGNIFICANT CHANGE UP (ref 135–145)

## 2023-08-22 PROCEDURE — 93010 ELECTROCARDIOGRAM REPORT: CPT

## 2023-08-22 DEVICE — PEG KT SAFETY 20FR: Type: IMPLANTABLE DEVICE | Status: FUNCTIONAL

## 2023-08-22 RX ORDER — ACETAMINOPHEN 500 MG
450 TABLET ORAL ONCE
Refills: 0 | Status: DISCONTINUED | OUTPATIENT
Start: 2023-08-22 | End: 2023-08-22

## 2023-08-22 RX ORDER — SODIUM CHLORIDE 9 MG/ML
500 INJECTION, SOLUTION INTRAVENOUS
Refills: 0 | Status: DISCONTINUED | OUTPATIENT
Start: 2023-08-22 | End: 2023-08-25

## 2023-08-22 RX ORDER — ONDANSETRON 8 MG/1
4 TABLET, FILM COATED ORAL ONCE
Refills: 0 | Status: COMPLETED | OUTPATIENT
Start: 2023-08-22 | End: 2023-08-22

## 2023-08-22 RX ORDER — DEXTROSE 50 % IN WATER 50 %
15 SYRINGE (ML) INTRAVENOUS ONCE
Refills: 0 | Status: COMPLETED | OUTPATIENT
Start: 2023-08-22 | End: 2023-08-22

## 2023-08-22 RX ORDER — SODIUM CHLORIDE 9 MG/ML
1000 INJECTION, SOLUTION INTRAVENOUS
Refills: 0 | Status: DISCONTINUED | OUTPATIENT
Start: 2023-08-22 | End: 2023-08-22

## 2023-08-22 RX ORDER — DEXTROSE 50 % IN WATER 50 %
15 SYRINGE (ML) INTRAVENOUS ONCE
Refills: 0 | Status: DISCONTINUED | OUTPATIENT
Start: 2023-08-22 | End: 2023-08-22

## 2023-08-22 RX ORDER — ACETAMINOPHEN 500 MG
450 TABLET ORAL ONCE
Refills: 0 | Status: COMPLETED | OUTPATIENT
Start: 2023-08-22 | End: 2023-08-23

## 2023-08-22 RX ADMIN — SODIUM CHLORIDE 75 MILLILITER(S): 9 INJECTION, SOLUTION INTRAVENOUS at 06:26

## 2023-08-22 RX ADMIN — SODIUM CHLORIDE 30 MILLILITER(S): 9 INJECTION, SOLUTION INTRAVENOUS at 22:47

## 2023-08-22 RX ADMIN — PANTOPRAZOLE SODIUM 40 MILLIGRAM(S): 20 TABLET, DELAYED RELEASE ORAL at 09:14

## 2023-08-22 NOTE — DIETITIAN INITIAL EVALUATION ADULT - ETIOLOGY-BASIS
----- Message from Rudine Sessions sent at 7/6/2021  8:46 AM EDT -----  Subject: Message to Provider    QUESTIONS  Information for Provider? Patient would like a phone call if Dr. Ihsan Hennessy   has any in office appointments available soon. ---------------------------------------------------------------------------  --------------  Marlo Rump INFO  What is the best way for the office to contact you? OK to leave message on   voicemail  Preferred Call Back Phone Number? [phone]  ---------------------------------------------------------------------------  --------------  SCRIPT ANSWERS  Relationship to Patient?  Self Chronic illness

## 2023-08-22 NOTE — PHYSICAL THERAPY INITIAL EVALUATION ADULT - PERTINENT HX OF CURRENT PROBLEM, REHAB EVAL
27yo F PMH anorexia (NGT dependant, used to have PEG tube), OCD who presents here after ng tube dislodged 2 days ago. Pt reports feeling dry. Reports nasogastric tube fell out. Sometime her stomach bothers her a little but not currently. Does not have regular bowel movements, she's not sure when last one was; at baseline she might have one per week; urinates twice per day. Reports she pulled the NG tube out when she was crying about something; does not elaborate. Denies thoughts of hurting herself. Denies fever, chills, CP, palpitations, diarrhea, dysuria.  Pt defers most questions to mother, however very little information given.

## 2023-08-22 NOTE — DIETITIAN INITIAL EVALUATION ADULT - OTHER INFO
27yo F PMH anorexia (NGT dependant, used to have PEG tube), OCD who presents here after ng tube dislodged 2 days ago. Pt reports feeling dry. Reports nasogastric tube fell out. Sometime her stomach bothers her a little but not currently. Does not have regular bowel movements, she's not sure when last one was; at baseline she might have one per week; urinates twice per day. Reports she pulled the NG tube out when she was crying about something; does not elaborate. Denies thoughts of hurting herself. Denies fever, chills, CP, palpitations, diarrhea, dysuria.  Pt defers most questions to mother, however very little information given.    Patient seen at bedside for nutrition consult, however interview limited as patient going for PEG placement at time of assessment. Current diet order: NPO pending PEG placement. Patient reports home TF regimen of Jevity 1.5. See nutrition recommendations below. Unable to obtain diet/weight hx, will reattempt at follow up. Dosing weight: 66.4#, BMI 11.8. Noted with severe muscle wasting to temples and clavicles, however NFPE limited. No pressure injuries or edema documented. Noted with last BM 8/20 per EMR. Labs: glucose trending 53-66 x 24hrs. Meds: folate, thiamine. Based on ASPEN guidelines, pt does not meet criteria for malnutrition at this time.  27yo F PMH anorexia (NGT dependant, used to have PEG tube), OCD who presents here after ng tube dislodged 2 days ago. Pt reports feeling dry. Reports nasogastric tube fell out. Sometime her stomach bothers her a little but not currently. Does not have regular bowel movements, she's not sure when last one was; at baseline she might have one per week; urinates twice per day. Reports she pulled the NG tube out when she was crying about something; does not elaborate. Denies thoughts of hurting herself. Denies fever, chills, CP, palpitations, diarrhea, dysuria. Pt defers most questions to mother, however very little information given.    Patient seen at bedside for nutrition consult, however interview limited as patient going for PEG placement at time of assessment. Current diet order: NPO pending PEG placement. Patient reports home TF regimen of Jevity 1.5. See nutrition recommendations below. Unable to obtain diet/weight hx, will reattempt at follow up. Dosing weight: 66.4#, BMI 11.8. Noted with severe muscle wasting to temples and clavicles, however NFPE limited. No pressure injuries or edema documented. Noted with last BM 8/20 per EMR. Labs: glucose trending 53-66 x 24hrs. Meds: folate, thiamine. Based on ASPEN guidelines, pt does not meet criteria for malnutrition at this time.  29yo F PMH anorexia (NGT dependant, used to have PEG tube), OCD who presents here after ng tube dislodged 2 days ago. Pt reports feeling dry. Reports nasogastric tube fell out. Sometime her stomach bothers her a little but not currently. Does not have regular bowel movements, she's not sure when last one was; at baseline she might have one per week; urinates twice per day. Reports she pulled the NG tube out when she was crying about something; does not elaborate. Denies thoughts of hurting herself. Denies fever, chills, CP, palpitations, diarrhea, dysuria. Pt defers most questions to mother, however very little information given.    Patient seen at bedside for nutrition consult, however interview limited as patient going for PEG placement at time of assessment. Subjective information obtained from patient's mother. Current diet order: NPO pending PEG placement. Patient reports home TF regimen via NGT of Jevity 1.5 @ 60ml/hr x 24 hours, however per mother, patient was only administering 2 8oz bottles/day- this provides 711kcal and 30g protein, meeting <75% nutrition needs. Per mother, patient was not taking any food PO and relying solely on tube feeds. Reports weight of 85-90# x 6 months ago. Dosing weight: 66.4#, BMI 11.8. -19#/22% x 6 months, clinically significant. Weight loss likely due to patient's non-compliance with TF regimen. Noted with severe muscle wasting to temples and clavicles, however NFPE limited. No pressure injuries or edema documented. Noted with last BM 8/20 per EMR. Labs: glucose trending 53-66 x 24hrs. Meds: folate, thiamine. Based on ASPEN guidelines, patient meets criteria for severe malnutrition.  29yo F PMH anorexia (NGT dependant, used to have PEG tube), OCD who presents here after ng tube dislodged 2 days ago. Pt reports feeling dry. Reports nasogastric tube fell out. Sometime her stomach bothers her a little but not currently. Does not have regular bowel movements, she's not sure when last one was; at baseline she might have one per week; urinates twice per day. Reports she pulled the NG tube out when she was crying about something; does not elaborate. Denies thoughts of hurting herself. Denies fever, chills, CP, palpitations, diarrhea, dysuria. Pt defers most questions to mother, however very little information given.    Patient seen at bedside for nutrition consult, however interview limited as patient going for PEG placement at time of assessment. Subjective information obtained from patient's mother. Current diet order: NPO pending PEG placement. Patient reports home TF regimen via NGT of Jevity 1.5 @ 60ml/hr x 24 hours, however per mother, patient was only administering 2 8oz bottles/day- this provides 711kcal and 30g protein, meeting <75% nutrition needs. Per mother, patient was not taking any food PO and relying solely on tube feeds. Reports weight of 85-90# x 6 months ago. Dosing weight: 66.4#, BMI 11.8. -19#/22% x 6 months, clinically significant. Weight loss likely due to patient's non-compliance with TF regimen. Noted with severe muscle wasting to clavicles, however NFPE limited. No pressure injuries or edema documented. Noted with last BM 8/20 per EMR. Labs: glucose trending 53-66 x 24hrs. Meds: folate, thiamine. Based on ASPEN guidelines, patient meets criteria for severe malnutrition.  27yo F PMH anorexia (NGT dependant, used to have PEG tube), OCD who presents here after ng tube dislodged 2 days ago. Pt reports feeling dry. Reports nasogastric tube fell out. Sometime her stomach bothers her a little but not currently. Does not have regular bowel movements, she's not sure when last one was; at baseline she might have one per week; urinates twice per day. Reports she pulled the NG tube out when she was crying about something; does not elaborate. Denies thoughts of hurting herself. Denies fever, chills, CP, palpitations, diarrhea, dysuria. Pt defers most questions to mother, however very little information given.    Patient seen at bedside for nutrition consult, however interview limited as patient going for PEG placement at time of assessment. Subjective information obtained from patient's mother. Current diet order: NPO pending PEG placement. Patient reports home TF regimen via NGT of Jevity 1.5 @ 60ml/hr x 24 hours, however per mother, patient was only administering 2 8oz bottles/day- this provides 711kcal and 30g protein, meeting <75% nutrition needs. Per mother, patient was not taking any food PO and relying solely on tube feeds. Reports weight of 85-90# x 6 months ago. Dosing weight: 66.4#, BMI 11.8. -19#/22% x 6 months, clinically significant. Weight loss likely due to patient's non-compliance with TF regimen. Noted with severe muscle wasting to clavicles, however NFPE limited. No pressure injuries or edema documented. Noted with last BM 8/20 per EMR. Labs: glucose trending 53-66 x 24hrs. Meds: folate, thiamine. Based on ASPEN guidelines, patient meets criteria for severe malnutrition. See nutrition recommendations below.

## 2023-08-22 NOTE — DIETITIAN INITIAL EVALUATION ADULT - ADD RECOMMEND
1. When medically feasible, recommend EN via PEG: Jevity 1.5 @ 32mL/hr x 24 hours. This provides: 768mL TV, 1152kcal, 49g protein, 584mL free water. Meetinkcal/kg, 1.6g/kg protein based on ABW 30.1kg. Defer fluids to team. Maintain aspiration precautions. Monitor s/s of intolerance; adjust EN as needed.  >>Recommend to initiate TF 24 hours after PEG placement  >>Recommend starting at 20mL/hr and advancing by 10mL Q4hrs to goal as tolerated. Additional free H2O per team. Monitor for s/s intolerance; maintain aspiration precautions at all times.   2. Recommend regular diet with appropriate texture/consistency when cleared for PO   3. Encourage pt to meet nutritional needs as able   4. Monitor labs: electrolytes, cmp, finger stick  4. Monitor weights   5. Pain and bowel regimen per team  1. When medically feasible, recommend EN via PEG: Jevity 1.5 @ 32mL/hr x 24 hours. This provides: 768mL TV, 1152kcal, 49g protein, 584mL free water. Meetinkcal/kg, 1.6g/kg protein based on ABW 30.1kg. Defer fluids to team. Maintain aspiration precautions. Monitor s/s of intolerance; adjust EN as needed.  >>Recommend to initiate TF 24 hours after PEG placement  >>Recommend starting at 20mL/hr and advancing by 10mL Q4hrs to goal as tolerated. Additional free H2O per team. Monitor for s/s intolerance; maintain aspiration precautions at all times.   2. Recommend regular diet with appropriate texture/consistency when cleared for PO   3. Encourage pt to meet nutritional needs as able   4. Monitor labs: electrolytes, cmp, finger stick  5. Monitor weights   6. Pain and bowel regimen per team

## 2023-08-22 NOTE — DISCHARGE NOTE PROVIDER - PROVIDER TOKENS
PROVIDER:[TOKEN:[[id number]],FOLLOWUP:[2 weeks]] PROVIDER:[TOKEN:[[id number]],SCHEDULEDAPPT:[08/29/2023],SCHEDULEDAPPTTIME:[10:15 AM]]

## 2023-08-22 NOTE — DIETITIAN INITIAL EVALUATION ADULT - PROBLEM SELECTOR PLAN 1
Longstanding hx. Used to have PEG tube in past. Has had NGT for last 1yr. Pt pulled out NGT 2 days prior during emotional event but no SI or thoughts of hurting herself.   Pt does not typically take anything by mouth at all.    - D5 fluids standing  - q6h FSG to monitor for hypoglycemia  - thiamine and folic acid  - plan for PEG tube tomorrow  - consider Psych consult if pt amenable

## 2023-08-22 NOTE — PROGRESS NOTE ADULT - PROBLEM SELECTOR PLAN 1
Longstanding hx. Used to have PEG tube in past. Has had NGT for last 1yr. Pt pulled out NGT 2 days prior during emotional event but no SI or thoughts of hurting herself. Pt does not typically take anything by mouth at all.  - D5 fluids standing  - thiamine and folic acid IVP  - pt s/p PEG tube placement today, will restart feeds

## 2023-08-22 NOTE — DIETITIAN INITIAL EVALUATION ADULT - PERTINENT MEDS FT
MEDICATIONS  (STANDING):  dextrose 5% + sodium chloride 0.9%. 1000 milliLiter(s) (75 mL/Hr) IV Continuous <Continuous>  folic acid Injectable 1 milliGRAM(s) IV Push every 24 hours  pantoprazole  Injectable 40 milliGRAM(s) IV Push every 24 hours  thiamine Injectable 100 milliGRAM(s) IV Push every 24 hours    MEDICATIONS  (PRN):

## 2023-08-22 NOTE — PHYSICAL THERAPY INITIAL EVALUATION ADULT - MODALITIES TREATMENT COMMENTS
Pt demo ability to perform finger opposition (1st digit to ever other digit respectively). Cranial Nerves II - XII: II: PERRLA; visual fields are full to confrontation III, IV, VI: EOMI, no nystagmus appreciated V: facial sensation intact to light touch V1-V3 b/l VII: no ptosis, no facial droop, symmetric eyebrow raise and closure VIII: hearing intact to finger rub b/l  XI: head turning and shoulder shrug intact b/l XII: tongue protrusion midline

## 2023-08-22 NOTE — DIETITIAN NUTRITION RISK NOTIFICATION - TREATMENT: THE FOLLOWING DIET HAS BEEN RECOMMENDED
Diet, NPO after Midnight:      NPO Start Date: 21-Aug-2023,   NPO Start Time: 23:59 (08-21-23 @ 17:19) [Active]  Diet, Soft and Bite Sized (08-21-23 @ 11:40) [Active]

## 2023-08-22 NOTE — DISCHARGE NOTE PROVIDER - NSDCFUSCHEDAPPT_GEN_ALL_CORE_FT
[facility]  [facility] [address]  Scheduled Appointment: 08/29/2023

## 2023-08-22 NOTE — PRE-ANESTHESIA EVALUATION ADULT - NSANTHOSAYNRD_GEN_A_CORE
No. AURELIA screening performed.  STOP BANG Legend: 0-2 = LOW Risk; 3-4 = INTERMEDIATE Risk; 5-8 = HIGH Risk

## 2023-08-22 NOTE — DISCHARGE NOTE PROVIDER - NSDCMRMEDTOKEN_GEN_ALL_CORE_FT
metoprolol tartrate 25 mg oral tablet: 0.5 orally once a day (at bedtime)  Multiple Vitamins oral tablet: 1 tab(s) orally

## 2023-08-22 NOTE — DISCHARGE NOTE PROVIDER - NSDCFUADDAPPT_GEN_ALL_CORE_FT
Please bring your Insurance card, Photo ID and Discharge paperwork to the following appointment:    (1) Please follow up with [facility] with Dr. Ananda Joseph on behalf of Dr. Toñito Martinez at [address] [place] [address], [address] [place] [address] on 08/29/2023 at 10:15am.    Appointment was scheduled by Ms. MAILE Irwin, Referral Coordinator.

## 2023-08-22 NOTE — PHYSICAL THERAPY INITIAL EVALUATION ADULT - ADDITIONAL COMMENTS
Pt currently resides in 1st floor apartment, no HERMINIO w/ parents. Has 24/7 HHA to assist w/ all ADL tasks. Pt w/ h/o OCD - prefers to stay in bed due to not wanting to "touch the floor" As per father in room, pt amb only 3-4x within the past 2 years. Primarily gets around w/ WC. Denied falls within past 6 months. Primarily gets sponge baths in bed w/ HHA assist.

## 2023-08-22 NOTE — PRE-ANESTHESIA EVALUATION ADULT - NSANTHPMHFT_GEN_ALL_CORE
29yo F with chronic anorexia prior PEG placements 29yo F with chronic anorexia prior PEG placements, presents for replacement of PEG tube.  The patient has not had period for prolonged period of time due to weight loss.  She reports aneuria for 3 days and not able to provide urine for pregnancy test. She denies that she is pregnant and the form was signed. Regarding the aneuria, it is impossible to substanciate the fact as the patient used diaper. Electrolytes have been normal since the admission.

## 2023-08-22 NOTE — PHYSICAL THERAPY INITIAL EVALUATION ADULT - LEVEL OF INDEPENDENCE: SIT/STAND, REHAB EVAL
TBA - pt declined to participate in OOB transfers 2/2 h/o OCD .. Pt does not like to "touch floors".

## 2023-08-22 NOTE — DIETITIAN INITIAL EVALUATION ADULT - SIGNS/SYMPTOMS
as evidenced by BMI 11.8  as evidenced by 22% weight loss x 6 months, severe muscle wasting, meeting <75% nutrition needs

## 2023-08-22 NOTE — DISCHARGE NOTE PROVIDER - HOSPITAL COURSE
27yo F PMH anorexia (NGT dependant, used to have PEG tube), OCD who presents here after ng tube dislodged 2 days ago. Admit to [facility] for PEG tube placement with Dr. Buckley.    Problem list:  1. Anorexia: longstanding history of anorexia with multiple prior hospital admissions. Has had NGT for last year, was pulled out by patient 2 days prior due to emotional event. Patient does not take any PO intake. PEG placed on 8/22. #Discharge: do not delete    27 YO F PMH anorexia (NGT dependant, used to have PEG tube), OCD who presented after ng tube dislodged 2 days prior to admission. Pt reported feeling dry. Sometime her stomach bothers her a little but not at admission. Does not have regular bowel movements, she's not sure when last one was; at baseline she might have one per week; urinates twice per day. Reported she pulled the NG tube out when she was crying about something; did not elaborate. Denied thoughts of hurting herself. Denied fever, chills, CP, palpitations, diarrhea, dysuria. Pt defers most questions to mother, however very little information given. PEG placed 8/22 by Dr. Buckley. Post PEG tube placement drop in Hgb noted 12.3 -> 8.5 and patient went for CTAP 8/24 in the AM (initially refused CT). Found to have blood in the retroperitoneum and pneumomediastinum. Follow up CT 8/24 PM showed improvement, no changes in retroperitoneal hematoma.    Problem: Anorexia.   Longstanding hx. Used to have PEG tube in past. Has had NGT for last 1yr. Pt pulled out NGT during emotional event but no SI or thoughts of hurting herself.   Pt does not typically take anything by mouth at all.    Problem: Metabolic acidosis.   AG elevated, bicarb 18. Lactate neg. BHB 6.7.  Pt has not had any nutrition for 2 days prior to admission. No alcohol ingestions. No tylenol use.   Likely due to a starvation ketoacidosis.    Problem: Leukopenia.   Plan: Chronic leukopenia since 2019 but no neutropenia.  Suspect initial temp not accurate since it was axillary. No infectious ROS.  - trend CBC with outpatient provider    Problem: Anxiety.   Plan: Home med: clomipramine through NG tube  - resume at home    Problem: Sinus tachycardia.   Plan: Mother reports hx of fast HR, but not aware of afib. Sinus on admission ecg.  Home med: lopressor 12.5 PO qhs  Tachycardia upon admission, unclear baseline HR but pt hasn't had BB in two days. Also dehydrated.      Patient was discharged to: home    New medications:   Changes to old medications: none  Medications that were stopped: none    Items to follow up as outpatient: Follow up with outpatient provider for CBC    Physical exam at the time of discharge:  General: NAD  Head: NC/AT; MMM; PERRL; EOMI;  Neck: Supple; no JVD  Respiratory: CTAB; no wheezes  Cardiovascular: Regular rhythm/rate; S1/S2+, no murmur  Gastrointestinal: Soft; Diffuse tenderness to palpation same as 8/23 post PEF placement  Extremities: WWP; no edema/cyanosis  Neurological: A&Ox3; no obvious focal deficits

## 2023-08-22 NOTE — DISCHARGE NOTE PROVIDER - DETAILS OF MALNUTRITION DIAGNOSIS/DIAGNOSES
This patient has been assessed with a concern for Malnutrition and was treated during this hospitalization for the following Nutrition diagnosis/diagnoses:     -  08/22/2023: Severe protein-calorie malnutrition   -  08/22/2023: Underweight (BMI < 19)

## 2023-08-22 NOTE — PHYSICAL THERAPY INITIAL EVALUATION ADULT - BED MOBILITY LIMITATIONS, REHAB EVAL
Pt also demo ability to perform lateral scooting from bed to stretcher to be transported out of room for procedure.

## 2023-08-22 NOTE — PROVIDER CONTACT NOTE (CRITICAL VALUE NOTIFICATION) - ASSESSMENT
Pt A&Ox4, resting comfortably in bed. No c/o pain/discomfort/n/v/SOB/CP/ha. Pt currently receiving D5+NS @ 75 mL/hr. NPO for PEG placement

## 2023-08-22 NOTE — DISCHARGE NOTE PROVIDER - CARE PROVIDER_API CALL
Toñito Martinez)  Internal Medicine  [address], [address]nd [place] [address]  Phone: [phone]  Fax: [phone]  Follow Up Time: 2 weeks   Toñito Martinez)  Internal Medicine  [address], [address]nd [place] [address]  Phone: [phone]  Fax: [phone]  Scheduled Appointment: 08/29/2023 10:15 AM

## 2023-08-22 NOTE — DIETITIAN NUTRITION RISK NOTIFICATION - ADDITIONAL COMMENTS/DIETITIAN RECOMMENDATIONS
1. When medically feasible, recommend EN via PEG: Jevity 1.5 @ 32mL/hr x 24 hours. This provides: 768mL TV, 1152kcal, 49g protein, 584mL free water. Meetinkcal/kg, 1.6g/kg protein based on ABW 30.1kg. Defer fluids to team. Maintain aspiration precautions. Monitor s/s of intolerance; adjust EN as needed.  >>Recommend to initiate TF 24 hours after PEG placement  >>Recommend starting at 20mL/hr and advancing by 10mL Q4hrs to goal as tolerated. Additional free H2O per team. Monitor for s/s intolerance; maintain aspiration precautions at all times.   2. Recommend regular diet with appropriate texture/consistency when cleared for PO   3. Encourage pt to meet nutritional needs as able   4. Monitor labs: electrolytes, cmp, finger stick  5. Monitor weights   6. Pain and bowel regimen per team

## 2023-08-22 NOTE — DIETITIAN INITIAL EVALUATION ADULT - NS FNS DIET ORDER
Diet, NPO after Midnight:      NPO Start Date: 21-Aug-2023,   NPO Start Time: 23:59 (08-21-23 @ 17:19)

## 2023-08-22 NOTE — PROVIDER CONTACT NOTE (CRITICAL VALUE NOTIFICATION) - SITUATION
28 y.o. F admitted for failure to thrive with a glucose of 53 this AM. Pt currently NPO for PEG placement. Pt refused to take dextrose gel and stated "I do not take anything by mouth." Pt currently receiving D5+NS @ 75 mL/hr.

## 2023-08-22 NOTE — PROGRESS NOTE ADULT - PROBLEM SELECTOR PLAN 4
Home med: clomipramine 50mg 5 caps at bedtime through NG tube  - med rec for dosing  - resume after PEG tube placed

## 2023-08-22 NOTE — DIETITIAN INITIAL EVALUATION ADULT - OTHER CALCULATIONS
Based on Standards of Care pt below % IBW (57%) thus actual body weight used for all calculations. Needs adjusted for underweight status/anorexia. Fluid recs per team.  Based on Standards of Care pt below % IBW (57%) thus actual body weight used for all calculations. Needs adjusted for severe malnutrition. Fluid recs per team.

## 2023-08-22 NOTE — DISCHARGE NOTE PROVIDER - NSDCCPCAREPLAN_GEN_ALL_CORE_FT
PRINCIPAL DISCHARGE DIAGNOSIS  Diagnosis: Status post insertion of percutaneous endoscopic gastrostomy (PEG) tube  Assessment and Plan of Treatment: Care of the PEG Tube placed 8/22 (Jevity 1.5 32 ml/h continous feed)  • Always wash hands before handling your PEG tube.  • Clean the site with soap and water daily. DO NOT use hydrogen peroxide or any special cleansers. You may use a q-tip or gauze to swab gently around the site. Rinse well and pat dry. This may be done in the shower.  • Apply a clean dressing to the site. This should be changed daily or as needed. This dressing should be placed over the external bumper. Do not force adressing between the bumper and your skin.  • Tape the tube to your skin to prevent tugging on the skin leading to skin breakdown.  • Turn the bumper and tube retirement at least twice a day to prevent skin breakdown. Also gently push and pull tube in andout - 1/4 inch each day.  • Flush tube with 30 ml of water at least once a day.  Tube Placement  • Check the markings at the base of the tube daily and as needed.  • Slight in-and-out movement of the tube is normal and can help prevent complications.  Clogged Tube  • Try to irrigate the tube with 30 ml of warm water.  • Try to draw back with a 60-ml syringe.  • Gently rocking the syringe back and forth may help to dislodge the blockage.   Call your Physician’s office with the following problems:  • Redness, swelling, leakage, sores, or pus around the tube.  • Blood around the tube, or in the stool.  • A change of more than two numbers at the bolster near the base of the tube.  • A clogged tube that you cannot clear.  • The tube falls out. Call Immediately.  • Nausea that lasts more than 24 hours.  • Recurrent vomiting of more than 8 hours.  • Diarrhea that continues for more than 24 hours.  • Constipation that lasts for more than 3 days, depending on your normal frequency ofbowel movements, or hard stool for more than 5 days.  • Weight loss of more than 2 pounds in one week.  • Any unusual weakness or fever.

## 2023-08-22 NOTE — DIETITIAN INITIAL EVALUATION ADULT - PERTINENT LABORATORY DATA
08-22    139  |  105  |  14  ----------------------------<  53<LL>  See Note   |  16<L>  |  0.66    Ca    9.2      22 Aug 2023 07:11  Phos  2.9     08-22  Mg     2.0     08-22    TPro  8.0  /  Alb  4.7  /  TBili  0.4  /  DBili  x   /  AST  21  /  ALT  9<L>  /  AlkPhos  74  08-20

## 2023-08-22 NOTE — DISCHARGE NOTE PROVIDER - NSDCCPTREATMENT_GEN_ALL_CORE_FT
PRINCIPAL PROCEDURE  Procedure: CT scan  Findings and Treatment: IMPRESSION:  1. Since earlier same day CT, no significant change large retroperitoneal hematoma predominantly throughout the median central vascular compartment and left posterior iliopsoas retroperitoneal compartment, multiple small foci of periaortic enhancement isodense to adjacent vessels within the hematoma could represent small vascular branches as several appear elongated and linear on coronal reformats, a dedicated CTA with delayed phase could have been helpful, small foci of active arterial bleeding cannot be excluded. Trend hemoglobin.Consider angiographic correlation.  2. Pneumoperitoneum secondary to recent PEG tube manipulation has decreased. No pneumomediastinum seen.  3. Constipation.

## 2023-08-23 LAB
ANION GAP SERPL CALC-SCNC: 10 MMOL/L — SIGNIFICANT CHANGE UP (ref 5–17)
ANION GAP SERPL CALC-SCNC: 13 MMOL/L — SIGNIFICANT CHANGE UP (ref 5–17)
BUN SERPL-MCNC: 11 MG/DL — SIGNIFICANT CHANGE UP (ref 7–23)
BUN SERPL-MCNC: 8 MG/DL — SIGNIFICANT CHANGE UP (ref 7–23)
CALCIUM SERPL-MCNC: 8.8 MG/DL — SIGNIFICANT CHANGE UP (ref 8.4–10.5)
CALCIUM SERPL-MCNC: 8.8 MG/DL — SIGNIFICANT CHANGE UP (ref 8.4–10.5)
CHLORIDE SERPL-SCNC: 103 MMOL/L — SIGNIFICANT CHANGE UP (ref 96–108)
CHLORIDE SERPL-SCNC: 105 MMOL/L — SIGNIFICANT CHANGE UP (ref 96–108)
CO2 SERPL-SCNC: 23 MMOL/L — SIGNIFICANT CHANGE UP (ref 22–31)
CO2 SERPL-SCNC: 25 MMOL/L — SIGNIFICANT CHANGE UP (ref 22–31)
CREAT SERPL-MCNC: 0.57 MG/DL — SIGNIFICANT CHANGE UP (ref 0.5–1.3)
CREAT SERPL-MCNC: 0.58 MG/DL — SIGNIFICANT CHANGE UP (ref 0.5–1.3)
EGFR: 125 ML/MIN/1.73M2 — SIGNIFICANT CHANGE UP
EGFR: 125 ML/MIN/1.73M2 — SIGNIFICANT CHANGE UP
GLUCOSE SERPL-MCNC: 109 MG/DL — HIGH (ref 70–99)
GLUCOSE SERPL-MCNC: 123 MG/DL — HIGH (ref 70–99)
HCG SERPL-ACNC: <0 MIU/ML — SIGNIFICANT CHANGE UP
HCT VFR BLD CALC: 24.6 % — LOW (ref 34.5–45)
HCT VFR BLD CALC: 24.8 % — LOW (ref 34.5–45)
HCT VFR BLD CALC: 25.2 % — LOW (ref 34.5–45)
HGB BLD-MCNC: 8.1 G/DL — LOW (ref 11.5–15.5)
HGB BLD-MCNC: 8.2 G/DL — LOW (ref 11.5–15.5)
HGB BLD-MCNC: 8.5 G/DL — LOW (ref 11.5–15.5)
MAGNESIUM SERPL-MCNC: 1.6 MG/DL — SIGNIFICANT CHANGE UP (ref 1.6–2.6)
MCHC RBC-ENTMCNC: 31.2 PG — SIGNIFICANT CHANGE UP (ref 27–34)
MCHC RBC-ENTMCNC: 31.5 PG — SIGNIFICANT CHANGE UP (ref 27–34)
MCHC RBC-ENTMCNC: 31.8 PG — SIGNIFICANT CHANGE UP (ref 27–34)
MCHC RBC-ENTMCNC: 32.9 GM/DL — SIGNIFICANT CHANGE UP (ref 32–36)
MCHC RBC-ENTMCNC: 33.1 GM/DL — SIGNIFICANT CHANGE UP (ref 32–36)
MCHC RBC-ENTMCNC: 33.7 GM/DL — SIGNIFICANT CHANGE UP (ref 32–36)
MCV RBC AUTO: 93.3 FL — SIGNIFICANT CHANGE UP (ref 80–100)
MCV RBC AUTO: 94.6 FL — SIGNIFICANT CHANGE UP (ref 80–100)
MCV RBC AUTO: 96.1 FL — SIGNIFICANT CHANGE UP (ref 80–100)
NRBC # BLD: 0 /100 WBCS — SIGNIFICANT CHANGE UP (ref 0–0)
PHOSPHATE SERPL-MCNC: 3.4 MG/DL — SIGNIFICANT CHANGE UP (ref 2.5–4.5)
PLATELET # BLD AUTO: 214 K/UL — SIGNIFICANT CHANGE UP (ref 150–400)
PLATELET # BLD AUTO: 219 K/UL — SIGNIFICANT CHANGE UP (ref 150–400)
PLATELET # BLD AUTO: 239 K/UL — SIGNIFICANT CHANGE UP (ref 150–400)
POTASSIUM SERPL-MCNC: 3.5 MMOL/L — SIGNIFICANT CHANGE UP (ref 3.5–5.3)
POTASSIUM SERPL-MCNC: 3.6 MMOL/L — SIGNIFICANT CHANGE UP (ref 3.5–5.3)
POTASSIUM SERPL-SCNC: 3.5 MMOL/L — SIGNIFICANT CHANGE UP (ref 3.5–5.3)
POTASSIUM SERPL-SCNC: 3.6 MMOL/L — SIGNIFICANT CHANGE UP (ref 3.5–5.3)
RBC # BLD: 2.58 M/UL — LOW (ref 3.8–5.2)
RBC # BLD: 2.6 M/UL — LOW (ref 3.8–5.2)
RBC # BLD: 2.7 M/UL — LOW (ref 3.8–5.2)
RBC # FLD: 13.5 % — SIGNIFICANT CHANGE UP (ref 10.3–14.5)
RBC # FLD: 13.6 % — SIGNIFICANT CHANGE UP (ref 10.3–14.5)
RBC # FLD: 13.6 % — SIGNIFICANT CHANGE UP (ref 10.3–14.5)
SODIUM SERPL-SCNC: 138 MMOL/L — SIGNIFICANT CHANGE UP (ref 135–145)
SODIUM SERPL-SCNC: 141 MMOL/L — SIGNIFICANT CHANGE UP (ref 135–145)
WBC # BLD: 11.34 K/UL — HIGH (ref 3.8–10.5)
WBC # BLD: 6.67 K/UL — SIGNIFICANT CHANGE UP (ref 3.8–10.5)
WBC # BLD: 7.95 K/UL — SIGNIFICANT CHANGE UP (ref 3.8–10.5)
WBC # FLD AUTO: 11.34 K/UL — HIGH (ref 3.8–10.5)
WBC # FLD AUTO: 6.67 K/UL — SIGNIFICANT CHANGE UP (ref 3.8–10.5)
WBC # FLD AUTO: 7.95 K/UL — SIGNIFICANT CHANGE UP (ref 3.8–10.5)

## 2023-08-23 PROCEDURE — 76937 US GUIDE VASCULAR ACCESS: CPT | Mod: 26

## 2023-08-23 PROCEDURE — 36000 PLACE NEEDLE IN VEIN: CPT

## 2023-08-23 RX ORDER — MAGNESIUM SULFATE 500 MG/ML
2 VIAL (ML) INJECTION ONCE
Refills: 0 | Status: COMPLETED | OUTPATIENT
Start: 2023-08-23 | End: 2023-08-23

## 2023-08-23 RX ORDER — POTASSIUM CHLORIDE 20 MEQ
20 PACKET (EA) ORAL ONCE
Refills: 0 | Status: COMPLETED | OUTPATIENT
Start: 2023-08-23 | End: 2023-08-23

## 2023-08-23 RX ADMIN — Medication 450 MILLIGRAM(S): at 05:17

## 2023-08-23 RX ADMIN — Medication 25 GRAM(S): at 13:58

## 2023-08-23 RX ADMIN — Medication 180 MILLIGRAM(S): at 00:29

## 2023-08-23 RX ADMIN — Medication 100 MILLIGRAM(S): at 01:29

## 2023-08-23 RX ADMIN — PANTOPRAZOLE SODIUM 40 MILLIGRAM(S): 20 TABLET, DELAYED RELEASE ORAL at 08:52

## 2023-08-23 RX ADMIN — Medication 100 MILLIGRAM(S): at 23:10

## 2023-08-23 RX ADMIN — Medication 1 MILLIGRAM(S): at 01:45

## 2023-08-23 RX ADMIN — Medication 1 MILLIGRAM(S): at 23:09

## 2023-08-23 NOTE — CHART NOTE - NSCHARTNOTEFT_GEN_A_CORE
Spoke to patient about CTAP to look for suspected bleed post PEG tube placement (hgb 12 ->8), but patient will not do the scan without a family member present. Pt educated about risks of refusing imaging, pt demonstrates understanding. Will further assess ?bleeding with CBC.

## 2023-08-23 NOTE — PROGRESS NOTE ADULT - PROBLEM SELECTOR PLAN 1
Longstanding hx. Used to have PEG tube in past. Has had NGT for last 1yr. Pt pulled out NGT 2 days prior during emotional event but no SI or thoughts of hurting herself. Pt does not typically take anything by mouth at all.  - D5 fluids standing  - thiamine and folic acid IVP  - pt s/p PEG tube placement yesterday  - PEG feeds started

## 2023-08-23 NOTE — PROCEDURE NOTE - NSICDXPROCEDURE_GEN_ALL_CORE_FT
PROCEDURES:  Insertion of intravenous catheter with ultrasound guidance 23-Aug-2023 00:31:08  Toby Mooney

## 2023-08-24 LAB
ANION GAP SERPL CALC-SCNC: 10 MMOL/L — SIGNIFICANT CHANGE UP (ref 5–17)
BASOPHILS # BLD AUTO: 0.04 K/UL — SIGNIFICANT CHANGE UP (ref 0–0.2)
BASOPHILS NFR BLD AUTO: 0.7 % — SIGNIFICANT CHANGE UP (ref 0–2)
BUN SERPL-MCNC: 8 MG/DL — SIGNIFICANT CHANGE UP (ref 7–23)
CALCIUM SERPL-MCNC: 8.7 MG/DL — SIGNIFICANT CHANGE UP (ref 8.4–10.5)
CHLORIDE SERPL-SCNC: 104 MMOL/L — SIGNIFICANT CHANGE UP (ref 96–108)
CO2 SERPL-SCNC: 26 MMOL/L — SIGNIFICANT CHANGE UP (ref 22–31)
CREAT SERPL-MCNC: 0.58 MG/DL — SIGNIFICANT CHANGE UP (ref 0.5–1.3)
EGFR: 125 ML/MIN/1.73M2 — SIGNIFICANT CHANGE UP
EOSINOPHIL # BLD AUTO: 0.01 K/UL — SIGNIFICANT CHANGE UP (ref 0–0.5)
EOSINOPHIL NFR BLD AUTO: 0.2 % — SIGNIFICANT CHANGE UP (ref 0–6)
GLUCOSE SERPL-MCNC: 98 MG/DL — SIGNIFICANT CHANGE UP (ref 70–99)
HCT VFR BLD CALC: 22.9 % — LOW (ref 34.5–45)
HCT VFR BLD CALC: 24.6 % — LOW (ref 34.5–45)
HGB BLD-MCNC: 7.4 G/DL — LOW (ref 11.5–15.5)
HGB BLD-MCNC: 7.9 G/DL — LOW (ref 11.5–15.5)
IMM GRANULOCYTES NFR BLD AUTO: 0.3 % — SIGNIFICANT CHANGE UP (ref 0–0.9)
LYMPHOCYTES # BLD AUTO: 1.46 K/UL — SIGNIFICANT CHANGE UP (ref 1–3.3)
LYMPHOCYTES # BLD AUTO: 23.9 % — SIGNIFICANT CHANGE UP (ref 13–44)
MAGNESIUM SERPL-MCNC: 1.9 MG/DL — SIGNIFICANT CHANGE UP (ref 1.6–2.6)
MCHC RBC-ENTMCNC: 30.5 PG — SIGNIFICANT CHANGE UP (ref 27–34)
MCHC RBC-ENTMCNC: 30.8 PG — SIGNIFICANT CHANGE UP (ref 27–34)
MCHC RBC-ENTMCNC: 32.1 GM/DL — SIGNIFICANT CHANGE UP (ref 32–36)
MCHC RBC-ENTMCNC: 32.3 GM/DL — SIGNIFICANT CHANGE UP (ref 32–36)
MCV RBC AUTO: 95 FL — SIGNIFICANT CHANGE UP (ref 80–100)
MCV RBC AUTO: 95.4 FL — SIGNIFICANT CHANGE UP (ref 80–100)
MONOCYTES # BLD AUTO: 0.4 K/UL — SIGNIFICANT CHANGE UP (ref 0–0.9)
MONOCYTES NFR BLD AUTO: 6.5 % — SIGNIFICANT CHANGE UP (ref 2–14)
NEUTROPHILS # BLD AUTO: 4.18 K/UL — SIGNIFICANT CHANGE UP (ref 1.8–7.4)
NEUTROPHILS NFR BLD AUTO: 68.4 % — SIGNIFICANT CHANGE UP (ref 43–77)
NRBC # BLD: 0 /100 WBCS — SIGNIFICANT CHANGE UP (ref 0–0)
NRBC # BLD: 0 /100 WBCS — SIGNIFICANT CHANGE UP (ref 0–0)
PHOSPHATE SERPL-MCNC: 2.3 MG/DL — LOW (ref 2.5–4.5)
PLATELET # BLD AUTO: 185 K/UL — SIGNIFICANT CHANGE UP (ref 150–400)
PLATELET # BLD AUTO: 197 K/UL — SIGNIFICANT CHANGE UP (ref 150–400)
POTASSIUM SERPL-MCNC: 3.2 MMOL/L — LOW (ref 3.5–5.3)
POTASSIUM SERPL-SCNC: 3.2 MMOL/L — LOW (ref 3.5–5.3)
RBC # BLD: 2.4 M/UL — LOW (ref 3.8–5.2)
RBC # BLD: 2.59 M/UL — LOW (ref 3.8–5.2)
RBC # FLD: 13.6 % — SIGNIFICANT CHANGE UP (ref 10.3–14.5)
RBC # FLD: 13.6 % — SIGNIFICANT CHANGE UP (ref 10.3–14.5)
SODIUM SERPL-SCNC: 140 MMOL/L — SIGNIFICANT CHANGE UP (ref 135–145)
WBC # BLD: 5.19 K/UL — SIGNIFICANT CHANGE UP (ref 3.8–10.5)
WBC # BLD: 6.11 K/UL — SIGNIFICANT CHANGE UP (ref 3.8–10.5)
WBC # FLD AUTO: 5.19 K/UL — SIGNIFICANT CHANGE UP (ref 3.8–10.5)
WBC # FLD AUTO: 6.11 K/UL — SIGNIFICANT CHANGE UP (ref 3.8–10.5)

## 2023-08-24 PROCEDURE — 74176 CT ABD & PELVIS W/O CONTRAST: CPT | Mod: 26

## 2023-08-24 PROCEDURE — 71260 CT THORAX DX C+: CPT | Mod: 26

## 2023-08-24 PROCEDURE — 74177 CT ABD & PELVIS W/CONTRAST: CPT | Mod: 26

## 2023-08-24 RX ORDER — POTASSIUM CHLORIDE 20 MEQ
20 PACKET (EA) ORAL ONCE
Refills: 0 | Status: COMPLETED | OUTPATIENT
Start: 2023-08-24 | End: 2023-08-24

## 2023-08-24 RX ORDER — ACETAMINOPHEN 500 MG
450 TABLET ORAL ONCE
Refills: 0 | Status: COMPLETED | OUTPATIENT
Start: 2023-08-24 | End: 2023-08-24

## 2023-08-24 RX ORDER — IOHEXOL 300 MG/ML
30 INJECTION, SOLUTION INTRAVENOUS ONCE
Refills: 0 | Status: COMPLETED | OUTPATIENT
Start: 2023-08-24 | End: 2023-08-24

## 2023-08-24 RX ORDER — POTASSIUM PHOSPHATE, MONOBASIC POTASSIUM PHOSPHATE, DIBASIC 236; 224 MG/ML; MG/ML
30 INJECTION, SOLUTION INTRAVENOUS ONCE
Refills: 0 | Status: COMPLETED | OUTPATIENT
Start: 2023-08-24 | End: 2023-08-24

## 2023-08-24 RX ADMIN — Medication 1 MILLIGRAM(S): at 21:57

## 2023-08-24 RX ADMIN — Medication 20 MILLIEQUIVALENT(S): at 13:22

## 2023-08-24 RX ADMIN — IOHEXOL 30 MILLILITER(S): 300 INJECTION, SOLUTION INTRAVENOUS at 16:52

## 2023-08-24 RX ADMIN — PANTOPRAZOLE SODIUM 40 MILLIGRAM(S): 20 TABLET, DELAYED RELEASE ORAL at 08:36

## 2023-08-24 RX ADMIN — Medication 450 MILLIGRAM(S): at 01:30

## 2023-08-24 RX ADMIN — Medication 180 MILLIGRAM(S): at 01:01

## 2023-08-24 RX ADMIN — Medication 100 MILLIGRAM(S): at 22:05

## 2023-08-24 RX ADMIN — POTASSIUM PHOSPHATE, MONOBASIC POTASSIUM PHOSPHATE, DIBASIC 83.33 MILLIMOLE(S): 236; 224 INJECTION, SOLUTION INTRAVENOUS at 16:16

## 2023-08-24 NOTE — CONSULT NOTE ADULT - ASSESSMENT
I M    28 y o F PMH anorexia (NGT dependant, used to have PEG tube), OCD who presents here after ng tube dislodged 2 days ago. Admit to [facility] for PEG tube placement with Dr. Buckley.      Problem/Plan - 1:  ·  Problem: Anorexia.   ·  Plan: Longstanding hx. Used to have PEG tube in past. Has had NGT for last 1yr. Pt pulled out NGT 2 days prior during emotional event but no SI or thoughts of hurting herself. Pt does not typically take anything by mouth at all.  - D5 fluids standing  - thiamine and folic acid IVP  - plan for PEG tube tomorrow.    Problem/Plan - 2:  ·  Problem: Metabolic acidosis.   ·  Plan: AG remains elevated, bicarb 18. Lactate neg. BHB 6.7. Pt has not had any nutrition for 2 days. No alcohol ingestions. No tylenol use. Likely due to a starvation ketoacidosis.  - check BMP in AM  - fluids as above.    Problem/Plan - 3:  ·  Problem: Leukopenia.   ·  Plan: Chronic leukopenia since 2019 but no neutropenia.  Suspect initial temp not accurate since it was axillary. No infectious ROS.  - trend CBC.    Problem/Plan - 4:  ·  Problem: Anxiety.   ·  Plan: Home med: clomipramine through NG tube  - med rec for dosing  - resume after PEG tube placed.    Problem/Plan - 5:  ·  Problem: Sinus tachycardia.   ·  Plan: Mother reports hx of fast HR, but not aware of afib.  Sinus on admission ecg.  Home med: lopressor 12.5 PO qhs  Tachycardia upon admission, unclear baseline HR but pt hasn't had BB in two days. Also dehydrated.  - monitor HR  - lopressor prn.    Problem/Plan - 6:  ·  Problem: Prophylactic measure.   ·  Plan: F: D5NS  E: replenish as appropriate  N: NPO for PEG tube procedure    VTE Prophylaxis: SCDs  GI: protonix  C: Full Code  D: [facility].    
Assessment:  27yo F PMHx anorexia nervosa, OCD, anxiety, PSHx of multiple PEG tube replacements (as tx for anorexia, cannot tolerate PO), who presented on 8/20 after NGT dislodged 2 days prior. Patient is now s/p PEG tube placement (Dr. Buckley; 8/22/23). Postop course was c/b hypotension and tachycardia, slow Hgb drop from 12.3 on admission to 8s. Dry CTAP was ordered. General surgery was consulted to evaluate PEG tube as source of decrease in Hgb. Currently, pt is afebrile, HR in 90s-low 100s, otherwise VS wnl. On exam, PEG tube in place, with site c/d/i. PEG flushes easily without issue. Soft, scaphoid abdomen, ND, mild-mod ttp in lower quadrants. No rebound or guarding. Labs notable for WBC 6, Hgb 7.9 (12.3 two days ago), remainder wnl. Dry CTAP from 8/24 AM showing gastrostomy tube in correct positioning in the stomach. Indistinct hyperdense material in the retroperitoneum, most likely representing retroperitoneal hematoma/blood products. Small volume of hyperdense free fluid in the pelvis, most likely blood products. Redundant, gas-filled sigmoid colon at the upper limits of normal in caliber. Early obstruction cannot be excluded. Patient has been HDS, w/ Hgb stable in high 7s, low 8s. Will require CT w/ contrast to further evaluate.     Recommendations:  - Please obtain CT Chest/Abdomen/Pelvis w/ PO/IV contrast. Give PO contrast via PEG tube.   - Plan communicated in person to [facility] team 2 residents  - Team 4c will continue to follow, please call with any questions or concerns  Plan discussed with chief resident and attending, Dr. Montalvo

## 2023-08-24 NOTE — PROGRESS NOTE ADULT - PROBLEM SELECTOR PLAN 4
Home med: clomipramine 50mg 5 caps at bedtime through NG tube  - med rec for dosing  - resume after PEG tube placed Home med: clomipramine 50mg 5 caps at bedtime through NG tube  - med rec for dosing  - consider resuming after PEG placed

## 2023-08-24 NOTE — CONSULT NOTE ADULT - SUBJECTIVE AND OBJECTIVE BOX
HPI:  27yo F PMH anorexia (NGT dependant, used to have PEG tube), OCD who presents here after ng tube dislodged 2 days ago. Pt reports feeling dry. Reports nasogastric tube fell out. Sometime her stomach bothers her a little but not currently. Does not have regular bowel movements, she's not sure when last one was; at baseline she might have one per week; urinates twice per day. Reports she pulled the NG tube out when she was crying about something; does not elaborate. Denies thoughts of hurting herself. Denies fever, chills, CP, palpitations, diarrhea, dysuria.  Pt defers most questions to mother, however very little information given.      ED course:   P/w temp 94.5 axillary (pt refused oral/rectal), -->repeat normal  labs: wbc 3.79, bicarb 16, AG 26, glucose 62, lactate 1.2, VBG pH 7.27  ecg: regular, sinus, QTc 495  Interventions: folic acid 1mg IV, thiamine 100mg IV, NS 500cc, D5NS @75cc/h (20 Aug 2023 22:40)      GENERAL SURGERY ADDENDUM:  27yo F PMHx anorexia nervosa, OCD, anxiety, PSHx of multiple PEG tube replacements (as tx for anorexia, cannot tolerate PO), who presented on 8/20 after NGT dislodged 2 days prior. Patient is now s/p PEG tube placement (Dr. Buckley; 8/22/23). Postop course was c/b hypotension and tachycardia, slow Hgb drop from 12.3 on admission to 8s. Dry CTAP was ordered. General surgery was consulted to evaluate PEG tube as source of decrease in Hgb.     Currently, patient reports that she has both stomach and back pain. She is unable to describe the pain or severity. She reports mild nausea but seems to be her baseline. Denies any recent vomiting. +F, unsure of last BM or consistency. Denied any recent subjective fevers, chills, sob or cp.     Of note, a PEG tube was initially placed 2.5 yrs ago, was infected and replaced at [facility]. Pt has since had PEG replacements at [facility], and most recently at [facility], where she was admitted for 14-15 months for nutritional optimization as per father at bedside. Her PEG was removed approx 8 months ago and was utilizing an NGT instead since. NGT has fallen out and replaced on mulitple occasions. NGT fell out most recently this past friday and she presented to ED for PEG replacement with Dr. Buckley 8/22.      Currently, pt is afebrile, HR in 90s-low 100s, otherwise VS wnl. On exam, PEG tube in place, with site c/d/i. PEG flushes easily without issue. Soft, scaphoid abdomen, ND, mild-mod ttp in lower quadrants. No rebound or guarding. Labs notable for WBC 6, Hgb 7.9 (12.3 two days ago), remainder wnl. Dry CTAP from 8/24 AM showing gastrostomy tube in correct positioning in the stomach. Indistinct hyperdense material in the retroperitoneum, most likely representing retroperitoneal hematoma/blood products. Small volume of hyperdense free fluid in the pelvis, most likely blood products. Redundant, gas-filled sigmoid colon at the upper limits of normal in caliber. Early obstruction cannot be excluded.    PMH: OCD, Anorexia nervosa, anxiety  PSHx: multiple PEG replacements  Medications: anafranil 250mg daily, metoprolol 12.5mg q12, melatonin 5mg nightly, thiamine 50mg daily, thiamine 50mg daily, famotidine 20mg daily, loratidine 10mg daily.   Allergies: NKDA    dextrose 5% + sodium chloride 0.9%. 500 milliLiter(s) IV Continuous <Continuous>  folic acid Injectable 1 milliGRAM(s) IV Push every 24 hours  pantoprazole  Injectable 40 milliGRAM(s) IV Push every 24 hours  potassium chloride   Powder 20 milliEquivalent(s) Oral once  potassium phosphate IVPB 30 milliMole(s) IV Intermittent once  thiamine Injectable 100 milliGRAM(s) IV Push every 24 hours      Allergies    No Known Allergies    Intolerances        ICU Vital Signs Last 24 Hrs  T(F): 98.2 (08-24-23 @ 12:18), Max: 98.4 (08-23-23 @ 21:29)  HR: 100 (08-24-23 @ 12:18) (100 - 109)  BP: 115/77 (08-24-23 @ 12:18) (115/77 - 115/77)  BP(mean): --  ABP: --  RR: 17 (08-24-23 @ 12:18) (17 - 18)  SpO2: 98% (08-24-23 @ 12:18) (98% - 98%)    General: AAOx3, NAD, laying comfortably in bed. Unable to ambulate well.  Cardio: RRR  Pulm: breathing comfortably on RA  Abdomen: PEG tube in place, with site c/d/i. PEG flushes easily without issue. Soft, scaphoid abdomen, ND, mild-mod ttp in lower quadrants. No rebound or guarding.  Extremities: WWP, peripheral pulses appreciated    LABS:    08-24    140  |  104  |  8   ----------------------------<  98  3.2<L>   |  26  |  0.58    Ca    8.7      24 Aug 2023 05:30  Phos  2.3     08-24  Mg     1.9     08-24                          7.9    6.11  )-----------( 197      ( 24 Aug 2023 05:30 )             24.6     Urinalysis Basic - ( 24 Aug 2023 05:30 )    Color: x / Appearance: x / SG: x / pH: x  Gluc: 98 mg/dL / Ketone: x  / Bili: x / Urobili: x   Blood: x / Protein: x / Nitrite: x   Leuk Esterase: x / RBC: x / WBC x   Sq Epi: x / Non Sq Epi: x / Bacteria: x    CAPILLARY BLOOD GLUCOSE    CT Abdomen and Pelvis No Cont:   ACC: [id number] EXAM:  CT ABDOMEN AND PELVIS   ORDERED BY: ARDHA MCLAIN     PROCEDURE DATE:  08/24/2023          INTERPRETATION:  CLINICAL INFORMATION: Acute drop in hemoglobin.   Postoperative gastrostomy placement.    COMPARISON: None.    CONTRAST/COMPLICATIONS:  IV Contrast: None  Oral Contrast: None  Complications: None    PROCEDURE:  CT of the Abdomen and Pelvis was performed.  Sagittal and coronal reformats were performed.    FINDINGS:  LOWER CHEST: Tiny pockets of gas along the right heart border.    LIVER: Within normal limits.  BILE DUCTS: Normal caliber.  GALLBLADDER: Within normal limits.  SPLEEN: Within normal limits.  PANCREAS: Within normal limits.  ADRENALS: Within normal limits.  KIDNEYS/URETERS: A few nonobstructing stones measuring up to 0.3 cm in   the right kidney. Unremarkable left kidney.    BLADDER: Within normal limits.  REPRODUCTIVE ORGANS: Uterus and adnexa within normal limits.    BOWEL: Redundant, gas-filled sigmoid colon at the upper limits of normal   in caliber. Heavy stool burden in the remaining more proximal segments of   large bowel. Left upper quadrant gastrostomy tube terminates within the   gastric body. Appendix is not visualized. No evidence of inflammation in   the pericecal region.  PERITONEUM:Indistinct hyperdense material in the retroperitoneum, most   likely blood products. Small volume of hyperdense free fluid in the   pelvis. Tiny pockets of extraluminal gas are noted in the perihepatic   region and nondependent peritoneal cavity, consistent with postprocedural   changes.  VESSELS: Within normal limits.  RETROPERITONEUM/LYMPH NODES: No mesenteric lymphadenopathy.   Retroperitoneal lymph nodes are obscured by hyperdense fluid.  ABDOMINAL WALL: Within normal limits.  BONES: Status post fixation of the left femoral head and neck.    IMPRESSION:    1. Indistinct hyperdense material in the retroperitoneum, most likely   representing retroperitoneal hematoma/blood products. Small volume of   hyperdense free fluid in the pelvis, mostlikely blood products.  2. Redundant, gas-filled sigmoid colon at the upper limits of normal in   caliber. Early obstruction cannot be excluded. Close monitoring is   recommended.  3. Mild pneumomediastinum, probably secondary to postprocedural   pneumoperitoneum.  4. Gastrostomy tube appears to be in the appropriate position.    Findings were discussed with Dr. Anderson on 8/24/2023 10:31 AM by Dr. Gonsalez with read back confirmation.    --- End of Report ---      AMY GONSALEZ MD; Attending Radiologist  This document has been electronically signed. Aug 24 2023 10:38AM (08-24-23 @ 09:24)  
  Patient is a 28y old  Female who presents with a chief complaint of PEG tube placement (21 Aug 2023 11:57)      HPI:  29yo F PMH anorexia (NGT dependant, used to have PEG tube), OCD who presents here after ng tube dislodged 2 days ago. Pt reports feeling dry. Reports nasogastric tube fell out. Sometime her stomach bothers her a little but not currently. Does not have regular bowel movements, she's not sure when last one was; at baseline she might have one per week; urinates twice per day. Reports she pulled the NG tube out when she was crying about something; does not elaborate. Denies thoughts of hurting herself. Denies fever, chills, CP, palpitations, diarrhea, dysuria.  Pt defers most questions to mother, however very little information given.      ED course:   P/w temp 94.5 axillary (pt refused oral/rectal), -->repeat normal  labs: wbc 3.79, bicarb 16, AG 26, glucose 62, lactate 1.2, VBG pH 7.27  ecg: regular, sinus, QTc 495  Interventions: folic acid 1mg IV, thiamine 100mg IV, NS 500cc, D5NS @75cc/h (20 Aug 2023 22:40)    PAST MEDICAL & SURGICAL HISTORY:  OCD (obsessive compulsive disorder)      Anorexia nervosa      S/P percutaneous endoscopic gastrostomy (PEG) tube placement        MEDICATIONS  (STANDING):  dextrose 5% + sodium chloride 0.9%. 1000 milliLiter(s) (75 mL/Hr) IV Continuous <Continuous>  folic acid Injectable 1 milliGRAM(s) IV Push every 24 hours  pantoprazole  Injectable 40 milliGRAM(s) IV Push every 24 hours  thiamine Injectable 100 milliGRAM(s) IV Push every 24 hours    MEDICATIONS  (PRN):            FAMILY HISTORY:      CBC Full  -  ( 21 Aug 2023 07:58 )  WBC Count : 4.02 K/uL  RBC Count : 3.98 M/uL  Hemoglobin : 12.3 g/dL  Hematocrit : 38.0 %  Platelet Count - Automated : 267 K/uL  Mean Cell Volume : 95.5 fl  Mean Cell Hemoglobin : 30.9 pg  Mean Cell Hemoglobin Concentration : 32.4 gm/dL  Auto Neutrophil # : x  Auto Lymphocyte # : x  Auto Monocyte # : x  Auto Eosinophil # : x  Auto Basophil # : x  Auto Neutrophil % : x  Auto Lymphocyte % : x  Auto Monocyte % : x  Auto Eosinophil % : x  Auto Basophil % : x      08-22    139  |  105  |  14  ----------------------------<  53<LL>  See Note   |  16<L>  |  0.66    Ca    9.2      22 Aug 2023 07:11  Phos  2.9     08-22  Mg     2.0     08-22    TPro  8.0  /  Alb  4.7  /  TBili  0.4  /  DBili  x   /  AST  21  /  ALT  9<L>  /  AlkPhos  74  08-20      Urinalysis Basic - ( 22 Aug 2023 07:11 )    Color: x / Appearance: x / SG: x / pH: x  Gluc: 53 mg/dL / Ketone: x  / Bili: x / Urobili: x   Blood: x / Protein: x / Nitrite: x   Leuk Esterase: x / RBC: x / WBC x   Sq Epi: x / Non Sq Epi: x / Bacteria: x        Radiology :               Review of Systems : per HPI         Vital Signs Last 24 Hrs  T(C): 36.4 (22 Aug 2023 04:54), Max: 36.4 (22 Aug 2023 04:54)  T(F): 97.5 (22 Aug 2023 04:54), Max: 97.5 (22 Aug 2023 04:54)  HR: 83 (22 Aug 2023 04:54) (82 - 86)  BP: 107/74 (22 Aug 2023 04:54) (101/75 - 109/87)  BP(mean): --  RR: 17 (22 Aug 2023 04:54) (16 - 17)  SpO2: 98% (22 Aug 2023 04:54) (95% - 98%)    Parameters below as of 22 Aug 2023 04:54  Patient On (Oxygen Delivery Method): room air            Physical Exam :  cachectic 28 y o woman lying comfortably in semi Rinaldi's position , awake , alert , no acute complaints , feels tired     Head : normocephalic , atraumatic    Eyes : PERRLA , EOMI , no nystagmus , sclera anicteric    ENT / FACE : neg nasal discharge , uvula midline , no oropharyngeal erythema / exudate    Neck : supple , negative JVD , negative carotid bruits , no thyromegaly    Chest : CTA bilaterally , neg wheeze / rhonchi / rales / crackles / egophany    Cardiovascular : regular rate and rhythm , neg murmurs / rubs / gallops    Abdomen : + PEG c/d/i ,  soft , non distended , non tender to palpation in all 4 quadrants ,  normal bowel sounds     Extremities : WWP , neg cyanosis /clubbing / edema     Neurologic Exam :    Alert and oriented x 3     Motor Exam:          Right UE:               no focal weakness ,  > 3+/5 throughout     Left UE:                 no focal weakness ,  > 3+/5 throughout         Right LE:    no focal weakness ,  > 3+/5 throughout        Left LE:    no focal weakness ,  > 3+/5 throughout         Sensation :         intact to light touch x 4 extremities     DTR :     biceps/brachioradialis : equal                      patella/ankle : equal        Gait :  not tested          PM&R Impression :          - deconditioned    - no focal weakness          Recommendations / Plan :      1) Physical / Occupational therapy focusing on therapeutic exercises , equipment evaluation , bed mobility/transfer out of bed evaluation , progressive ambulation with assistive devices prn .    2) Current disposition plan recommendation  :  d/c home, home PT, continue 24/7 [facility]

## 2023-08-24 NOTE — PROGRESS NOTE ADULT - PROBLEM SELECTOR PLAN 1
Longstanding hx. Used to have PEG tube in past. Has had NGT for last 1yr. Pt pulled out NGT 2 days prior during emotional event but no SI or thoughts of hurting herself. Pt does not typically take anything by mouth at all.  - D5 fluids standing  - thiamine and folic acid IVP  - pt s/p PEG tube placement yesterday  - PEG feeds started Longstanding hx. Used to have PEG tube in past. Has had NGT for last 1yr. Pt pulled out NGT 2 days prior during emotional event but no SI or thoughts of hurting herself. Pt does not typically take anything by mouth at all.  S/P PEG placement 8/22  - D5 fluids standing  - thiamine and folic acid IVP  - PEG feeds started

## 2023-08-25 ENCOUNTER — TRANSCRIPTION ENCOUNTER (OUTPATIENT)
Age: 28
End: 2023-08-25

## 2023-08-25 VITALS
DIASTOLIC BLOOD PRESSURE: 77 MMHG | TEMPERATURE: 98 F | RESPIRATION RATE: 18 BRPM | OXYGEN SATURATION: 95 % | HEART RATE: 92 BPM | SYSTOLIC BLOOD PRESSURE: 108 MMHG

## 2023-08-25 DIAGNOSIS — R62.7 ADULT FAILURE TO THRIVE: ICD-10-CM

## 2023-08-25 LAB
ALBUMIN SERPL ELPH-MCNC: 3.5 G/DL — SIGNIFICANT CHANGE UP (ref 3.3–5)
ALP SERPL-CCNC: 62 U/L — SIGNIFICANT CHANGE UP (ref 40–120)
ALT FLD-CCNC: <5 U/L — LOW (ref 10–45)
ANION GAP SERPL CALC-SCNC: 11 MMOL/L — SIGNIFICANT CHANGE UP (ref 5–17)
AST SERPL-CCNC: 13 U/L — SIGNIFICANT CHANGE UP (ref 10–40)
BASOPHILS # BLD AUTO: 0.03 K/UL — SIGNIFICANT CHANGE UP (ref 0–0.2)
BASOPHILS NFR BLD AUTO: 0.7 % — SIGNIFICANT CHANGE UP (ref 0–2)
BILIRUB SERPL-MCNC: 0.4 MG/DL — SIGNIFICANT CHANGE UP (ref 0.2–1.2)
BUN SERPL-MCNC: 6 MG/DL — LOW (ref 7–23)
CALCIUM SERPL-MCNC: 8.8 MG/DL — SIGNIFICANT CHANGE UP (ref 8.4–10.5)
CHLORIDE SERPL-SCNC: 103 MMOL/L — SIGNIFICANT CHANGE UP (ref 96–108)
CO2 SERPL-SCNC: 26 MMOL/L — SIGNIFICANT CHANGE UP (ref 22–31)
CREAT SERPL-MCNC: 0.47 MG/DL — LOW (ref 0.5–1.3)
EGFR: 131 ML/MIN/1.73M2 — SIGNIFICANT CHANGE UP
EOSINOPHIL # BLD AUTO: 0.08 K/UL — SIGNIFICANT CHANGE UP (ref 0–0.5)
EOSINOPHIL NFR BLD AUTO: 2 % — SIGNIFICANT CHANGE UP (ref 0–6)
GLUCOSE SERPL-MCNC: 97 MG/DL — SIGNIFICANT CHANGE UP (ref 70–99)
HCT VFR BLD CALC: 22.3 % — LOW (ref 34.5–45)
HGB BLD-MCNC: 7.3 G/DL — LOW (ref 11.5–15.5)
IMM GRANULOCYTES NFR BLD AUTO: 0.2 % — SIGNIFICANT CHANGE UP (ref 0–0.9)
LYMPHOCYTES # BLD AUTO: 1.03 K/UL — SIGNIFICANT CHANGE UP (ref 1–3.3)
LYMPHOCYTES # BLD AUTO: 25.3 % — SIGNIFICANT CHANGE UP (ref 13–44)
MAGNESIUM SERPL-MCNC: 1.8 MG/DL — SIGNIFICANT CHANGE UP (ref 1.6–2.6)
MCHC RBC-ENTMCNC: 30.9 PG — SIGNIFICANT CHANGE UP (ref 27–34)
MCHC RBC-ENTMCNC: 32.7 GM/DL — SIGNIFICANT CHANGE UP (ref 32–36)
MCV RBC AUTO: 94.5 FL — SIGNIFICANT CHANGE UP (ref 80–100)
MONOCYTES # BLD AUTO: 0.29 K/UL — SIGNIFICANT CHANGE UP (ref 0–0.9)
MONOCYTES NFR BLD AUTO: 7.1 % — SIGNIFICANT CHANGE UP (ref 2–14)
NEUTROPHILS # BLD AUTO: 2.63 K/UL — SIGNIFICANT CHANGE UP (ref 1.8–7.4)
NEUTROPHILS NFR BLD AUTO: 64.7 % — SIGNIFICANT CHANGE UP (ref 43–77)
NRBC # BLD: 0 /100 WBCS — SIGNIFICANT CHANGE UP (ref 0–0)
PHOSPHATE SERPL-MCNC: 2.4 MG/DL — LOW (ref 2.5–4.5)
PLATELET # BLD AUTO: 193 K/UL — SIGNIFICANT CHANGE UP (ref 150–400)
POTASSIUM SERPL-MCNC: 3.4 MMOL/L — LOW (ref 3.5–5.3)
POTASSIUM SERPL-SCNC: 3.4 MMOL/L — LOW (ref 3.5–5.3)
PROT SERPL-MCNC: 6 G/DL — SIGNIFICANT CHANGE UP (ref 6–8.3)
RBC # BLD: 2.36 M/UL — LOW (ref 3.8–5.2)
RBC # FLD: 13.4 % — SIGNIFICANT CHANGE UP (ref 10.3–14.5)
SODIUM SERPL-SCNC: 140 MMOL/L — SIGNIFICANT CHANGE UP (ref 135–145)
WBC # BLD: 4.07 K/UL — SIGNIFICANT CHANGE UP (ref 3.8–10.5)
WBC # FLD AUTO: 4.07 K/UL — SIGNIFICANT CHANGE UP (ref 3.8–10.5)

## 2023-08-25 PROCEDURE — 85027 COMPLETE CBC AUTOMATED: CPT

## 2023-08-25 PROCEDURE — 97161 PT EVAL LOW COMPLEX 20 MIN: CPT

## 2023-08-25 PROCEDURE — 96375 TX/PRO/DX INJ NEW DRUG ADDON: CPT

## 2023-08-25 PROCEDURE — 84132 ASSAY OF SERUM POTASSIUM: CPT

## 2023-08-25 PROCEDURE — 82962 GLUCOSE BLOOD TEST: CPT

## 2023-08-25 PROCEDURE — 85025 COMPLETE CBC W/AUTO DIFF WBC: CPT

## 2023-08-25 PROCEDURE — 74177 CT ABD & PELVIS W/CONTRAST: CPT

## 2023-08-25 PROCEDURE — 80048 BASIC METABOLIC PNL TOTAL CA: CPT

## 2023-08-25 PROCEDURE — 84702 CHORIONIC GONADOTROPIN TEST: CPT

## 2023-08-25 PROCEDURE — 82010 KETONE BODYS QUAN: CPT

## 2023-08-25 PROCEDURE — 74176 CT ABD & PELVIS W/O CONTRAST: CPT

## 2023-08-25 PROCEDURE — L8699: CPT

## 2023-08-25 PROCEDURE — 36415 COLL VENOUS BLD VENIPUNCTURE: CPT

## 2023-08-25 PROCEDURE — 82330 ASSAY OF CALCIUM: CPT

## 2023-08-25 PROCEDURE — 99285 EMERGENCY DEPT VISIT HI MDM: CPT | Mod: 25

## 2023-08-25 PROCEDURE — 96374 THER/PROPH/DIAG INJ IV PUSH: CPT

## 2023-08-25 PROCEDURE — 83605 ASSAY OF LACTIC ACID: CPT

## 2023-08-25 PROCEDURE — 85610 PROTHROMBIN TIME: CPT

## 2023-08-25 PROCEDURE — 83930 ASSAY OF BLOOD OSMOLALITY: CPT

## 2023-08-25 PROCEDURE — 85730 THROMBOPLASTIN TIME PARTIAL: CPT

## 2023-08-25 PROCEDURE — 80053 COMPREHEN METABOLIC PANEL: CPT

## 2023-08-25 PROCEDURE — 84295 ASSAY OF SERUM SODIUM: CPT

## 2023-08-25 PROCEDURE — 93005 ELECTROCARDIOGRAM TRACING: CPT

## 2023-08-25 PROCEDURE — 71260 CT THORAX DX C+: CPT

## 2023-08-25 PROCEDURE — 84100 ASSAY OF PHOSPHORUS: CPT

## 2023-08-25 PROCEDURE — 83735 ASSAY OF MAGNESIUM: CPT

## 2023-08-25 PROCEDURE — 82803 BLOOD GASES ANY COMBINATION: CPT

## 2023-08-25 RX ORDER — POLYETHYLENE GLYCOL 3350 17 G/17G
17 POWDER, FOR SOLUTION ORAL ONCE
Refills: 0 | Status: COMPLETED | OUTPATIENT
Start: 2023-08-25 | End: 2023-08-25

## 2023-08-25 RX ADMIN — PANTOPRAZOLE SODIUM 40 MILLIGRAM(S): 20 TABLET, DELAYED RELEASE ORAL at 09:47

## 2023-08-25 RX ADMIN — POLYETHYLENE GLYCOL 3350 17 GRAM(S): 17 POWDER, FOR SOLUTION ORAL at 17:24

## 2023-08-25 NOTE — PROGRESS NOTE ADULT - NUTRITIONAL ASSESSMENT
This patient has been assessed with a concern for Malnutrition and has been determined to have a diagnosis/diagnoses of Severe protein-calorie malnutrition and Underweight (BMI < 19).    This patient is being managed with:   Diet NPO after Midnight-     NPO Start Date: 21-Aug-2023   NPO Start Time: 23:59  Entered: Aug 21 2023  5:18PM    Diet Soft and Bite Sized-  Entered: Aug 21 2023 11:40AM  
This patient has been assessed with a concern for Malnutrition and has been determined to have a diagnosis/diagnoses of Severe protein-calorie malnutrition and Underweight (BMI < 19).    This patient is being managed with:   Diet NPO-  Tube Feeding Modality: Gastrostomy  Jevity 1.5 Christian (JEVITY1.5)  Total Volume for 24 Hours (mL): 768  Total Number of Cans: 4  Continuous  Starting Tube Feed Rate {mL per Hour}: 20  Increase Tube Feed Rate by (mL): 10     Every 4 hours  Until Goal Tube Feed Rate (mL per Hour): 32  Tube Feed Duration (in Hours): 24  Tube Feed Start Time: 19:00  Entered: Aug 23 2023  5:51PM  
This patient has been assessed with a concern for Malnutrition and has been determined to have a diagnosis/diagnoses of Severe protein-calorie malnutrition and Underweight (BMI < 19).    This patient is being managed with:   Diet NPO after Midnight-     NPO Start Date: 21-Aug-2023   NPO Start Time: 23:59  Entered: Aug 21 2023  5:18PM    Diet Soft and Bite Sized-  Entered: Aug 21 2023 11:40AM  
This patient has been assessed with a concern for Malnutrition and has been determined to have a diagnosis/diagnoses of Severe protein-calorie malnutrition and Underweight (BMI < 19).    This patient is being managed with:   Diet NPO-  Tube Feeding Modality: Gastrostomy  Jevity 1.5 Christian (JEVITY1.5)  Total Volume for 24 Hours (mL): 768  Total Number of Cans: 4  Continuous  Starting Tube Feed Rate {mL per Hour}: 20  Increase Tube Feed Rate by (mL): 10     Every 4 hours  Until Goal Tube Feed Rate (mL per Hour): 32  Tube Feed Duration (in Hours): 24  Tube Feed Start Time: 19:00  Entered: Aug 23 2023  5:51PM  
This patient has been assessed with a concern for Malnutrition and has been determined to have a diagnosis/diagnoses of Severe protein-calorie malnutrition and Underweight (BMI < 19).    This patient is being managed with:   Diet NPO-  Tube Feeding Modality: Gastrostomy  Jevity 1.5 Christian (JEVITY1.5)  Total Volume for 24 Hours (mL): 768  Total Number of Cans: 4  Continuous  Starting Tube Feed Rate {mL per Hour}: 20  Increase Tube Feed Rate by (mL): 10     Every 4 hours  Until Goal Tube Feed Rate (mL per Hour): 32  Tube Feed Duration (in Hours): 24  Tube Feed Start Time: 19:00  Entered: Aug 23 2023  5:51PM

## 2023-08-25 NOTE — PROGRESS NOTE ADULT - PROBLEM SELECTOR PLAN 5
Mother reports hx of fast HR, but not aware of afib.  Sinus on admission ecg.  Home med: metoprolol tartrate 25mg PO daily  Tachycardia upon admission, unclear baseline HR but pt hasn't had BB in two days. Also dehydrated.  - monitor HR  - lopressor prn
Mother reports hx of fast HR, but not aware of afib.  Sinus on admission ecg.  Home med: lopressor 12.5 PO qhs  Tachycardia upon admission, unclear baseline HR but pt hasn't had BB in two days. Also dehydrated.  - monitor HR  - lopressor prn

## 2023-08-25 NOTE — PROGRESS NOTE ADULT - PROVIDER SPECIALTY LIST ADULT
Gastroenterology
Gastroenterology
Internal Medicine
Gastroenterology
Rehab Medicine
Gastroenterology
Surgery
Internal Medicine

## 2023-08-25 NOTE — PROGRESS NOTE ADULT - REASON FOR ADMISSION
PEG tube placement

## 2023-08-25 NOTE — DISCHARGE NOTE NURSING/CASE MANAGEMENT/SOCIAL WORK - PATIENT PORTAL LINK FT
You can access the FollowMyHealth Patient Portal offered by [facility] by registering at the following website: [email or web address]. By joining [facility]’s FollowMyHealth portal, you will also be able to view your health information using other applications (apps) compatible with our system.

## 2023-08-25 NOTE — PROGRESS NOTE ADULT - ASSESSMENT
27yo F PMHx anorexia nervosa, OCD, anxiety, PSHx of multiple PEG tube replacements (as tx for anorexia, cannot tolerate PO), who presented on 8/20 after NGT dislodged 2 days prior. Patient is now s/p PEG tube placement (Dr. Buckley; 8/22/23). Postop course was c/b hypotension and tachycardia, slow Hgb drop from 12.3 on admission to 8s. Dry CTAP was ordered. General surgery was consulted to evaluate PEG tube as source of decrease in Hgb. Currently, pt is afebrile, HR in 90s-low 100s, otherwise VS wnl. On exam, PEG tube in place, with site c/d/i. PEG flushes easily without issue. Soft, scaphoid abdomen, ND, mild-mod ttp in lower quadrants. No rebound or guarding. Labs notable for WBC 6, Hgb 7.9 (12.3 two days ago), remainder wnl. Dry CTAP from 8/24 AM showing gastrostomy tube in correct positioning in the stomach. Indistinct hyperdense material in the retroperitoneum, most likely representing retroperitoneal hematoma/blood products. Repeat CT A/P with PO and IV contrast re-demonstrated RP hematoma with some tiny focal hyperdensities, and decreasing pneumoperitoneum. Hgb slowly downtrending over the last 24 hours (7.4, 7.9, 8.1, 8.2, 8.5).     Recommendations:  - No intervention at this time   - Continue trending Hgb   - Team 4c will continue to follow, please call with any questions or concerns    Plan discussed with chief resident and attending, Dr. Montalvo
29yo F PMH anorexia (NGT dependant, used to have PEG tube), OCD who presents here after ng tube dislodged 2 days ago. Admit to [facility] for PEG tube placement with Dr. Buckley.
I M    28 y o F PMH anorexia (NGT dependant, used to have PEG tube), OCD who presents here after ng tube dislodged 2 days ago. Admit to [facility] for PEG tube placement with Dr. Buckley.    Nutritional Assessment:  · Nutritional Assessment	This patient has been assessed with a concern for Malnutrition and has been determined to have a diagnosis/diagnoses of Severe protein-calorie malnutrition and Underweight (BMI < 19).    This patient is being managed with:   Diet NPO-  Tube Feeding Modality: Gastrostomy  Jevity 1.5 Christian (JEVITY1.5)  Total Volume for 24 Hours (mL): 768  Total Number of Cans: 4  Continuous  Starting Tube Feed Rate {mL per Hour}: 20  Increase Tube Feed Rate by (mL): 10     Every 4 hours  Until Goal Tube Feed Rate (mL per Hour): 32  Tube Feed Duration (in Hours): 24  Tube Feed Start Time: 19:00  Entered: Aug 23 2023  5:51PM    Problem/Plan - 1:  ·  Problem: Anorexia.   ·  Plan: Longstanding hx. Used to have PEG tube in past. Has had NGT for last 1yr. Pt pulled out NGT 2 days prior during emotional event but no SI or thoughts of hurting herself. Pt does not typically take anything by mouth at all.  - D5 fluids standing  - thiamine and folic acid IVP  - pt s/p PEG tube placement yesterday  - PEG feeds started.    Problem/Plan - 2:  ·  Problem: Metabolic acidosis.   ·  Plan: AG remains elevated, bicarb 18. Lactate neg. BHB 6.7. Pt has not had any nutrition for 2 days. No alcohol ingestions. No tylenol use. Likely due to a starvation ketoacidosis.  - check BMP in AM  - fluids as above.    Problem/Plan - 3:  ·  Problem: Leukopenia.   ·  Plan: Chronic leukopenia since 2019 but no neutropenia.  Suspect initial temp not accurate since it was axillary. No infectious ROS.  - trend CBC.    Problem/Plan - 4:  ·  Problem: Anxiety.   ·  Plan: Home med: clomipramine 50mg 5 caps at bedtime through NG tube  - med rec for dosing  - resume after PEG tube placed.    Problem/Plan - 5:  ·  Problem: Sinus tachycardia.   ·  Plan: Mother reports hx of fast HR, but not aware of afib.  Sinus on admission ecg.  Home med: metoprolol tartrate 25mg PO daily  Tachycardia upon admission, unclear baseline HR but pt hasn't had BB in two days. Also dehydrated.  - monitor HR  - lopressor prn.    Problem/Plan - 6:  ·  Problem: Prophylactic measure.   ·  Plan: F: D5NS  E: replenish as appropriate  N: NPO for PEG tube procedure    VTE Prophylaxis: SCDs  GI: protonix  C: Full Code  D: F.  
adult failure to thrive  anorexia nervosa  severe malnutrition protein and caolrie  denydration  IV Hydrate  PEG tomorrow am
27yo F PMH anorexia (NGT dependant, used to have PEG tube), OCD who presents here after ng tube dislodged 2 days ago. Admit to [facility] for PEG tube placement with Dr. Buckley.
29yo F PMH anorexia (NGT dependant, used to have PEG tube), OCD who presents here after ng tube dislodged 2 days ago. Admit to [facility] for PEG tube placement with Dr. Buckley.
retroperitoneal bleed   Pneumomediastinum  I have requested Dr Montalvo surgery for consultation  likely observe as Hgb stable currently  blood will self reabsorb  CT of chest for pneumomediastinum  IVF and NPO till we know ct is okay then commence tube feed.  WBC normal no antibiotics for now    
Hgb 7.4  no pneumomediastinum  if okay with surgery start feeds slowly
Hgb dropped to 8.5  please do ct  please monitor CBC  nutrition evaluation for prevention of refeeding syndrome  IV hydrate
27yo F PMH anorexia (NGT dependant, used to have PEG tube), OCD who presents here after ng tube dislodged 2 days ago. Admit to [facility] for PEG tube placement with Dr. Buckley.
27yo F PMH anorexia (NGT dependant, used to have PEG tube), OCD who presents here after ng tube dislodged 2 days ago. Admit to [facility] for PEG tube placement with Dr. Buckley.

## 2023-08-25 NOTE — PROGRESS NOTE ADULT - PROBLEM SELECTOR PLAN 4
Home med: clomipramine 50mg 5 caps at bedtime through NG tube  - med rec for dosing  - consider resuming after PEG placed

## 2023-08-25 NOTE — PROGRESS NOTE ADULT - PROBLEM SELECTOR PLAN 1
Longstanding hx. Used to have PEG tube in past. Has had NGT for last 1yr. Pt pulled out NGT 2 days prior during emotional event but no SI or thoughts of hurting herself. Pt does not typically take anything by mouth at all.  S/P PEG placement 8/22  - D5 fluids standing  - thiamine and folic acid IVP  - PEG feeds started

## 2023-08-25 NOTE — DISCHARGE NOTE NURSING/CASE MANAGEMENT/SOCIAL WORK - NSDCFUADDAPPT_GEN_ALL_CORE_FT
Please bring your Insurance card, Photo ID and Discharge paperwork to the following appointment:    (1) Please follow up with [facility] with Dr. Ananda Joseph on behalf of Dr. Toñito Martinez at [address] [place] [address], [address] [place] [address] on 08/29/2023 at 10:15am.    Appointment was scheduled by Ms. MAILE Irwin, Referral Coordinator.

## 2023-08-25 NOTE — PROGRESS NOTE ADULT - SUBJECTIVE AND OBJECTIVE BOX
29yo F PMH anorexia s/p PEG tube in past and also jejunostomy tube (NGT dependent for months), OCD who presents here after ng tube dislodged 2 days ago. Pt reports feeling dry. Reports nasogastric tube fell out. Sometime her stomach bothers her a little but not currently. Does not have regular bowel movements, she's not sure when last one was; at baseline she might have one per week; urinates twice per day. Reports she pulled the NG tube out when she was crying about something; does not elaborate. Baseline wiight is 100 lbs now down to 70. Denies thoughts of hurting herself. Denies fever, chills, CP, palpitations, diarrhea, dysuria.        ED course:   P/w temp 94.5 axillary (pt refused oral/rectal), -->repeat normal  labs: wbc 3.79, bicarb 16, AG 26, glucose 62, lactate 1.2, VBG pH 7.27  ecg: regular, sinus, QTc 495  Interventions: folic acid 1mg IV, thiamine 100mg IV, NS 500cc, D5NS @75cc/h (20 Aug 2023 22:40)      REVIEW OF SYSTEMS:  Constitutional: No fever, +weight loss   ENMT:  No difficulty hearing, tinnitus, vertigo; No sinus or throat pain  Respiratory: No cough, wheezing, chills or hemoptysis  Cardiovascular: No chest pain, palpitations, dizziness or leg swelling  Gastrointestinal: No abdominal or epigastric pain. No nausea, vomiting or hematemesis; No diarrhea or constipation. No melena or hematochezia.  Skin: No itching, burning, rashes or lesions   Musculoskeletal: No joint pain or swelling; No muscle, back or extremity pain    PAST MEDICAL & SURGICAL HISTORY:  OCD (obsessive compulsive disorder)      Anorexia nervosa      S/P percutaneous endoscopic gastrostomy (PEG) tube placement          FAMILY HISTORY:      SOCIAL HISTORY:  Smoking Status: [ ] Current, [ ] Former, [ ] Never  Pack Years:    MEDICATIONS:  MEDICATIONS  (STANDING):  dextrose 5% + sodium chloride 0.9%. 1000 milliLiter(s) (75 mL/Hr) IV Continuous <Continuous>  folic acid Injectable 1 milliGRAM(s) IV Push every 24 hours  pantoprazole  Injectable 40 milliGRAM(s) IV Push every 24 hours  thiamine Injectable 100 milliGRAM(s) IV Push every 24 hours    MEDICATIONS  (PRN):      Allergies    No Known Allergies    Intolerances        Vital Signs Last 24 Hrs  T(C): 36.4 (21 Aug 2023 06:36), Max: 36.4 (20 Aug 2023 22:08)  T(F): 97.5 (21 Aug 2023 06:36), Max: 97.6 (20 Aug 2023 22:45)  HR: 90 (21 Aug 2023 06:36) (58 - 110)  BP: 99/64 (21 Aug 2023 06:36) (99/64 - 118/84)  BP(mean): --  RR: 18 (21 Aug 2023 06:36) (14 - 18)  SpO2: 99% (21 Aug 2023 06:36) (99% - 100%)    Parameters below as of 21 Aug 2023 06:36  Patient On (Oxygen Delivery Method): room air        08-20 @ 07:01  -  08-21 @ 07:00  --------------------------------------------------------  IN: 450 mL / OUT: 0 mL / NET: 450 mL          PHYSICAL EXAM:    General: undernourished; in no acute distress  HEENT: MMM, conjunctiva and sclera clear  Lungs: clear  Heart: regualr  Gastrointestinal: Soft, non-tender non-distended; Normal bowel sounds; old scars  Extremities: Normal range of motion, No clubbing, cyanosis or edema  Neurological: Alert and oriented x3  Skin: Warm and dry. No obvious rash      LABS:                        12.3   4.02  )-----------( 267      ( 21 Aug 2023 07:58 )             38.0     08-21    144  |  107  |  19  ----------------------------<  66<L>  4.2   |  18<L>  |  0.72    Ca    9.1      21 Aug 2023 07:58  Phos  3.5     08-21  Mg     2.0     08-21    TPro  8.0  /  Alb  4.7  /  TBili  0.4  /  DBili  x   /  AST  21  /  ALT  9<L>  /  AlkPhos  74  08-20          RADIOLOGY & ADDITIONAL STUDIES:   
  Physical Medicine and Rehabilitation Progress Note :       Patient is a 28y old  Female who presents with a chief complaint of PEG tube placement (24 Aug 2023 06:25)      HPI:  27yo F PMH anorexia (NGT dependant, used to have PEG tube), OCD who presents here after ng tube dislodged 2 days ago. Pt reports feeling dry. Reports nasogastric tube fell out. Sometime her stomach bothers her a little but not currently. Does not have regular bowel movements, she's not sure when last one was; at baseline she might have one per week; urinates twice per day. Reports she pulled the NG tube out when she was crying about something; does not elaborate. Denies thoughts of hurting herself. Denies fever, chills, CP, palpitations, diarrhea, dysuria.  Pt defers most questions to mother, however very little information given.      ED course:   P/w temp 94.5 axillary (pt refused oral/rectal), -->repeat normal  labs: wbc 3.79, bicarb 16, AG 26, glucose 62, lactate 1.2, VBG pH 7.27  ecg: regular, sinus, QTc 495  Interventions: folic acid 1mg IV, thiamine 100mg IV, NS 500cc, D5NS @75cc/h (20 Aug 2023 22:40)                            7.9    6.11  )-----------( 197      ( 24 Aug 2023 05:30 )             24.6       08-24    140  |  104  |  8   ----------------------------<  98  3.2<L>   |  26  |  0.58    Ca    8.7      24 Aug 2023 05:30  Phos  2.3     08-24  Mg     1.9     08-24      Vital Signs Last 24 Hrs  T(C): 36.8 (24 Aug 2023 12:18), Max: 36.9 (23 Aug 2023 21:29)  T(F): 98.2 (24 Aug 2023 12:18), Max: 98.4 (23 Aug 2023 21:29)  HR: 100 (24 Aug 2023 12:18) (100 - 109)  BP: 115/77 (24 Aug 2023 12:18) (115/77 - 115/77)  BP(mean): --  RR: 17 (24 Aug 2023 12:18) (17 - 18)  SpO2: 98% (24 Aug 2023 12:18) (98% - 98%)    Parameters below as of 24 Aug 2023 12:18  Patient On (Oxygen Delivery Method): room air        MEDICATIONS  (STANDING):  dextrose 5% + sodium chloride 0.9%. 500 milliLiter(s) (30 mL/Hr) IV Continuous <Continuous>  folic acid Injectable 1 milliGRAM(s) IV Push every 24 hours  pantoprazole  Injectable 40 milliGRAM(s) IV Push every 24 hours  potassium chloride   Powder 20 milliEquivalent(s) Oral once  potassium phosphate IVPB 30 milliMole(s) IV Intermittent once  thiamine Injectable 100 milliGRAM(s) IV Push every 24 hours    MEDICATIONS  (PRN):          Initial Functional Status Assessment :       Previous Level of Function:     · Additional Comments	Pt currently resides in 1st floor apartment, no HERMINIO w/ parents. Has 24/7 HHA to assist w/ all ADL tasks. Pt w/ h/o OCD - prefers to stay in bed due to not wanting to "touch the floor" As per father in room, pt amb only 3-4x within the past 2 years. Primarily gets around w/ WC. Denied falls within past 6 months. Primarily gets sponge baths in bed w/ HHA assist.    Cognitive Status Examination:   · Orientation	oriented to person, place, time and situation  · Level of Consciousness	alert  · Follows Commands and Answers Questions	100% of the time  · Personal Safety and Judgment	intact    Range of Motion Exam:   · Active Range of Motion Examination	no Active ROM deficits were identified    Manual Muscle Testing:   · Manual Muscle Testing Results	At least 3/5 BL UE & LE based on observed ability to perform antigravity mobility. Grossly good bilateral  strength noted.    Bed Mobility: Rolling/Turning:     · Level of [place]	independent    Bed Mobility: Scooting/Bridging:     · Level of [place]	independent    Bed Mobility: Sit to Supine:     · Level of [place]	independent    Bed Mobility: Supine to Sit:     · Level of [place]	independent    Bed Mobility Analysis:     · Bed Mobility Limitations	Pt also demo ability to perform lateral scooting from bed to stretcher to be transported out of room for procedure.    Transfer: Sit to Stand:     · Level of [place]	TBA - pt declined to participate in OOB transfers 2/2 h/o OCD .. Pt does not like to "touch floors".    Gait Skills:     · Level of [place]	TBA    Balance Skills Assessment:     · Sitting Balance: Static	normal balance  · Sitting Balance: Dynamic	normal balance  · Systems Impairment Contributing to Balance Disturbance	musculoskeletal  · Identified Impairments Contributing to Balance Disturbance	decreased strength    Sensory Examination:   Sensory Examination:    Grossly Intact:   · Gross Sensory Examination	Grossly intact to light touch sensation throughout BLE and BUE. Denies numbness, tingling, burning or radiating symptoms along BLE and BUE.      Treatment Location:   · Comments	Pt demo ability to perform finger opposition (1st digit to ever other digit respectively). Cranial Nerves II - XII: II: PERRLA; visual fields are full to confrontation III, IV, VI: EOMI, no nystagmus appreciated V: facial sensation intact to light touch V1-V3 b/l VII: no ptosis, no facial droop, symmetric eyebrow raise and closure VIII: hearing intact to finger rub b/l  XI: head turning and shoulder shrug intact b/l XII: tongue protrusion midline    Clinical Impressions:   · Criteria for Skilled Therapeutic Interventions	impairments found; functional limitations in following categories; rehab potential; therapy frequency; anticipated discharge recommendation  · Impairments Found (describe specific impairments)	gait, locomotion, and balance; gross motor; posture; ROM; muscle strength  · Functional Limitations in Following Categories (describe specific limitations)	home management; work; community/leisure; self-care        PM&R Impression : as above    Current Disposition Plan Recommendations :      d/c home with home physical therapy  , continue HHA         
Pt seen and examined   some pain  sl nausea    REVIEW OF SYSTEMS:  Constitutional: No fever,  Cardiovascular: No chest pain, palpitations,   Gastrointestinal: post procedure  pain. sl nausea, no vomiting or hematemesis; No diarrhea or constipation. No melena or hematochezia.  Skin: No itching, burning, rashes or lesions       MEDICATIONS:  MEDICATIONS  (STANDING):  dextrose 5% + sodium chloride 0.9%. 500 milliLiter(s) (30 mL/Hr) IV Continuous <Continuous>  folic acid Injectable 1 milliGRAM(s) IV Push every 24 hours  pantoprazole  Injectable 40 milliGRAM(s) IV Push every 24 hours  thiamine Injectable 100 milliGRAM(s) IV Push every 24 hours    MEDICATIONS  (PRN):      Allergies    No Known Allergies    Intolerances        Vital Signs Last 24 Hrs  T(C): 36.4 (23 Aug 2023 06:05), Max: 36.7 (22 Aug 2023 23:00)  T(F): 97.6 (23 Aug 2023 06:05), Max: 98 (22 Aug 2023 23:00)  HR: 95 (23 Aug 2023 06:05) (83 - 101)  BP: 108/74 (23 Aug 2023 06:05) (106/75 - 124/88)  BP(mean): --  RR: 18 (23 Aug 2023 06:05) (17 - 18)  SpO2: 99% (23 Aug 2023 06:05) (98% - 100%)    Parameters below as of 23 Aug 2023 06:05  Patient On (Oxygen Delivery Method): room air          PHYSICAL EXAM:    General: severely l nourished; in no acute distress  HEENT: MMM, conjunctiva and sclera clear, temporal wasting  Gastrointestinal: Soft non-tender non-distended; Normal bowel sounds; peg site ok  Skin: Warm and dry. No obvious rash    LABS:      CBC Full  -  ( 23 Aug 2023 05:30 )  WBC Count : 11.34 K/uL  RBC Count : 2.70 M/uL  Hemoglobin : 8.5 g/dL  Hematocrit : 25.2 %  Platelet Count - Automated : 239 K/uL  Mean Cell Volume : 93.3 fl  Mean Cell Hemoglobin : 31.5 pg  Mean Cell Hemoglobin Concentration : 33.7 gm/dL  Auto Neutrophil # : x  Auto Lymphocyte # : x  Auto Monocyte # : x  Auto Eosinophil # : x  Auto Basophil # : x  Auto Neutrophil % : x  Auto Lymphocyte % : x  Auto Monocyte % : x  Auto Eosinophil % : x  Auto Basophil % : x    08-23    141  |  105  |  11  ----------------------------<  123<H>  3.6   |  23  |  0.58    Ca    8.8      23 Aug 2023 05:30  Phos  3.4     08-23  Mg     1.6     08-23            Urinalysis Basic - ( 23 Aug 2023 05:30 )    Color: x / Appearance: x / SG: x / pH: x  Gluc: 123 mg/dL / Ketone: x  / Bili: x / Urobili: x   Blood: x / Protein: x / Nitrite: x   Leuk Esterase: x / RBC: x / WBC x   Sq Epi: x / Non Sq Epi: x / Bacteria: x                RADIOLOGY & ADDITIONAL STUDIES (The following images were personally reviewed):
SUBJECTIVE:   Patient seen and examined at bedside this AM; resting comfortably   Endorsing continued abdominal pain controlled with meds   Denied nausea, emesis or chills       MEDICATIONS  (STANDING):  dextrose 5% + sodium chloride 0.9%. 500 milliLiter(s) (30 mL/Hr) IV Continuous <Continuous>  folic acid Injectable 1 milliGRAM(s) IV Push every 24 hours  pantoprazole  Injectable 40 milliGRAM(s) IV Push every 24 hours  thiamine Injectable 100 milliGRAM(s) IV Push every 24 hours    MEDICATIONS  (PRN):      Vital Signs Last 24 Hrs  T(C): 36.5 (25 Aug 2023 06:28), Max: 36.8 (24 Aug 2023 12:18)  T(F): 97.7 (25 Aug 2023 06:28), Max: 98.2 (24 Aug 2023 12:18)  HR: 92 (25 Aug 2023 06:28) (92 - 100)  BP: 108/77 (25 Aug 2023 06:28) (108/77 - 122/73)  BP(mean): 89 (24 Aug 2023 20:37) (89 - 89)  RR: 18 (25 Aug 2023 06:28) (16 - 18)  SpO2: 95% (25 Aug 2023 06:28) (95% - 98%)    Parameters below as of 25 Aug 2023 06:28  Patient On (Oxygen Delivery Method): room air        Physical Exam:  General: AAOx3, NAD, laying comfortably in bed. Unable to ambulate well.  Cardio: RRR  Pulm: breathing comfortably on RA  Abdomen: PEG tube in place, with site c/d/i. PEG flushes easily without issue. Soft, scaphoid abdomen, ND, mild ttp diffusely. No rebound or guarding.  Extremities: WWP, peripheral pulses appreciated      I&O's Summary      LABS:                        7.4    5.19  )-----------( 185      ( 24 Aug 2023 17:02 )             22.9     08-24    140  |  104  |  8   ----------------------------<  98  3.2<L>   |  26  |  0.58    Ca    8.7      24 Aug 2023 05:30  Phos  2.3     08-24  Mg     1.9     08-24        Urinalysis Basic - ( 24 Aug 2023 05:30 )    Color: x / Appearance: x / SG: x / pH: x  Gluc: 98 mg/dL / Ketone: x  / Bili: x / Urobili: x   Blood: x / Protein: x / Nitrite: x   Leuk Esterase: x / RBC: x / WBC x   Sq Epi: x / Non Sq Epi: x / Bacteria: x      CAPILLARY BLOOD GLUCOSE            RADIOLOGY & ADDITIONAL STUDIES:  
Pt seen and examined   denies any change  Hgb 7.4  CT noted    REVIEW OF SYSTEMS:  Constitutional: No fever,   Cardiovascular: No chest pain, palpitations, dizziness or leg swelling  Gastrointestinal: same epigastric pain. + nausea, no vomiting or hematemesis; No diarrhea or constipation. No melena or hematochezia.  Skin: No itching, burning, rashes or lesions       MEDICATIONS:  MEDICATIONS  (STANDING):  dextrose 5% + sodium chloride 0.9%. 500 milliLiter(s) (30 mL/Hr) IV Continuous <Continuous>  folic acid Injectable 1 milliGRAM(s) IV Push every 24 hours  pantoprazole  Injectable 40 milliGRAM(s) IV Push every 24 hours  thiamine Injectable 100 milliGRAM(s) IV Push every 24 hours    MEDICATIONS  (PRN):      Allergies    No Known Allergies    Intolerances        Vital Signs Last 24 Hrs  T(C): 36.5 (25 Aug 2023 06:28), Max: 36.8 (24 Aug 2023 12:18)  T(F): 97.7 (25 Aug 2023 06:28), Max: 98.2 (24 Aug 2023 12:18)  HR: 92 (25 Aug 2023 06:28) (92 - 100)  BP: 108/77 (25 Aug 2023 06:28) (108/77 - 122/73)  BP(mean): 89 (24 Aug 2023 20:37) (89 - 89)  RR: 18 (25 Aug 2023 06:28) (16 - 18)  SpO2: 95% (25 Aug 2023 06:28) (95% - 98%)    Parameters below as of 25 Aug 2023 06:28  Patient On (Oxygen Delivery Method): room air          PHYSICAL EXAM:    General: severely malnourished; in no acute distress  HEENT: MMM, conjunctiva and sclera clear  Lungs: clear  HeartL: regular  Gastrointestinal: Soft non-tender non-distended; Normal bowel sounds; PEG  Skin: Warm and dry. No obvious rash    LABS:      CBC Full  -  ( 24 Aug 2023 17:02 )  WBC Count : 5.19 K/uL  RBC Count : 2.40 M/uL  Hemoglobin : 7.4 g/dL  Hematocrit : 22.9 %  Platelet Count - Automated : 185 K/uL  Mean Cell Volume : 95.4 fl  Mean Cell Hemoglobin : 30.8 pg  Mean Cell Hemoglobin Concentration : 32.3 gm/dL  Auto Neutrophil # : x  Auto Lymphocyte # : x  Auto Monocyte # : x  Auto Eosinophil # : x  Auto Basophil # : x  Auto Neutrophil % : x  Auto Lymphocyte % : x  Auto Monocyte % : x  Auto Eosinophil % : x  Auto Basophil % : x    08-24    140  |  104  |  8   ----------------------------<  98  3.2<L>   |  26  |  0.58    Ca    8.7      24 Aug 2023 05:30  Phos  2.3     08-24  Mg     1.9     08-24            Urinalysis Basic - ( 24 Aug 2023 05:30 )    Color: x / Appearance: x / SG: x / pH: x  Gluc: 98 mg/dL / Ketone: x  / Bili: x / Urobili: x   Blood: x / Protein: x / Nitrite: x   Leuk Esterase: x / RBC: x / WBC x   Sq Epi: x / Non Sq Epi: x / Bacteria: x                RADIOLOGY & ADDITIONAL STUDIES (The following images were personally reviewed):
Pt seen and examined   CT done    REVIEW OF SYSTEMS:  Constitutional: No fever,  Cardiovascular: No chest pain, palpitations, dizziness   Gastrointestinal: same pain. sl nausea, no vomiting or hematemesis; No diarrhea or constipation. No melena or hematochezia.  Skin: No itching, burning, rashes or lesions       MEDICATIONS:  MEDICATIONS  (STANDING):  dextrose 5% + sodium chloride 0.9%. 500 milliLiter(s) (30 mL/Hr) IV Continuous <Continuous>  folic acid Injectable 1 milliGRAM(s) IV Push every 24 hours  pantoprazole  Injectable 40 milliGRAM(s) IV Push every 24 hours  potassium phosphate IVPB 30 milliMole(s) IV Intermittent once  thiamine Injectable 100 milliGRAM(s) IV Push every 24 hours    MEDICATIONS  (PRN):      Allergies    No Known Allergies    Intolerances        Vital Signs Last 24 Hrs  T(C): 36.8 (24 Aug 2023 12:18), Max: 36.9 (23 Aug 2023 21:29)  T(F): 98.2 (24 Aug 2023 12:18), Max: 98.4 (23 Aug 2023 21:29)  HR: 100 (24 Aug 2023 12:18) (100 - 109)  BP: 115/77 (24 Aug 2023 12:18) (115/77 - 115/77)  BP(mean): --  RR: 17 (24 Aug 2023 12:18) (17 - 18)  SpO2: 98% (24 Aug 2023 12:18) (98% - 98%)    Parameters below as of 24 Aug 2023 12:18  Patient On (Oxygen Delivery Method): room air          PHYSICAL EXAM:    General: severely malnourished; in no acute distress  HEENT: MMM, conjunctiva and sclera clear  Lungs: clear  Heart: regular  Gastrointestinal: Soft non-tender non-distended; Normal bowel sounds; PEG site ok  Skin: Warm and dry. No obvious rash    LABS:      CBC Full  -  ( 24 Aug 2023 05:30 )  WBC Count : 6.11 K/uL  RBC Count : 2.59 M/uL  Hemoglobin : 7.9 g/dL  Hematocrit : 24.6 %  Platelet Count - Automated : 197 K/uL  Mean Cell Volume : 95.0 fl  Mean Cell Hemoglobin : 30.5 pg  Mean Cell Hemoglobin Concentration : 32.1 gm/dL  Auto Neutrophil # : 4.18 K/uL  Auto Lymphocyte # : 1.46 K/uL  Auto Monocyte # : 0.40 K/uL  Auto Eosinophil # : 0.01 K/uL  Auto Basophil # : 0.04 K/uL  Auto Neutrophil % : 68.4 %  Auto Lymphocyte % : 23.9 %  Auto Monocyte % : 6.5 %  Auto Eosinophil % : 0.2 %  Auto Basophil % : 0.7 %    08-24    140  |  104  |  8   ----------------------------<  98  3.2<L>   |  26  |  0.58    Ca    8.7      24 Aug 2023 05:30  Phos  2.3     08-24  Mg     1.9     08-24            Urinalysis Basic - ( 24 Aug 2023 05:30 )    Color: x / Appearance: x / SG: x / pH: x  Gluc: 98 mg/dL / Ketone: x  / Bili: x / Urobili: x   Blood: x / Protein: x / Nitrite: x   Leuk Esterase: x / RBC: x / WBC x   Sq Epi: x / Non Sq Epi: x / Bacteria: x                RADIOLOGY & ADDITIONAL STUDIES (The following images were personally reviewed):
O/N Events: DANYELL    Subjective/ROS: Patient seen and examined at bedside. Resting comfortably, wearing gloves. Denying fingersticks this AM.    VITALS  Vital Signs Last 24 Hrs  T(C): 36.3 (21 Aug 2023 12:11), Max: 36.4 (20 Aug 2023 22:08)  T(F): 97.3 (21 Aug 2023 12:11), Max: 97.6 (20 Aug 2023 22:45)  HR: 82 (21 Aug 2023 12:11) (58 - 110)  BP: 109/87 (21 Aug 2023 12:11) (99/64 - 118/84)  BP(mean): --  RR: 16 (21 Aug 2023 12:11) (14 - 18)  SpO2: 96% (21 Aug 2023 12:11) (96% - 100%)    Parameters below as of 21 Aug 2023 12:11  Patient On (Oxygen Delivery Method): room air      CAPILLARY BLOOD GLUCOSE    POCT Blood Glucose.: 91 mg/dL (21 Aug 2023 05:20)    MEDICATIONS  (STANDING):  dextrose 5% + sodium chloride 0.9%. 1000 milliLiter(s) (75 mL/Hr) IV Continuous <Continuous>  folic acid Injectable 1 milliGRAM(s) IV Push every 24 hours  pantoprazole  Injectable 40 milliGRAM(s) IV Push every 24 hours  thiamine Injectable 100 milliGRAM(s) IV Push every 24 hours    LABS                        12.3   4.02  )-----------( 267      ( 21 Aug 2023 07:58 )             38.0     08-21    144  |  107  |  19  ----------------------------<  66<L>  4.2   |  18<L>  |  0.72    Ca    9.1      21 Aug 2023 07:58  Phos  3.5     08-21  Mg     2.0     08-21    TPro  8.0  /  Alb  4.7  /  TBili  0.4  /  DBili  x   /  AST  21  /  ALT  9<L>  /  AlkPhos  74  08-20    PT/INR - ( 21 Aug 2023 07:58 )   PT: 9.9 sec;   INR: 0.86   
O/N Events: 8PM, glucose 63. Increased D5 rate from 75, 125. Pt refused fsg. IV infiltrated, pt not able to get D5. Ordered dextrose gel, pt refused. Got IV and restarted D5 at 75cc/hr.    Subjective/ROS: Patient seen and examined at bedside. Resting comfortably, no complaints this AM. Denies headache, lightheadedness, CP.    VITALS  Vital Signs Last 24 Hrs  T(C): 36.4 (22 Aug 2023 11:36), Max: 36.4 (22 Aug 2023 04:54)  T(F): 97.5 (22 Aug 2023 04:54), Max: 97.5 (22 Aug 2023 04:54)  HR: 101 (22 Aug 2023 18:00) (83 - 101)  BP: 106/75 (22 Aug 2023 18:00) (101/75 - 107/74)  BP(mean): --  RR: 18 (22 Aug 2023 18:00) (16 - 18)  SpO2: 100% (22 Aug 2023 18:00) (95% - 100%)    Parameters below as of 22 Aug 2023 18:00  Patient On (Oxygen Delivery Method): room air        CAPILLARY BLOOD GLUCOSE    PHYSICAL EXAM  General: NAD, thin female, appearing younger than stated age  Head: pupils reactive  Neck: Supple; no JVD  Respiratory: CTAB  Cardiovascular: Regular rhythm/rate  Gastrointestinal: Soft; NTND  Extremities: WWP  Neurological: A&Ox3    MEDICATIONS  (STANDING):  dextrose 5% + sodium chloride 0.9%. 1000 milliLiter(s) (75 mL/Hr) IV Continuous <Continuous>  folic acid Injectable 1 milliGRAM(s) IV Push every 24 hours  ondansetron Injectable 4 milliGRAM(s) IV Push once  pantoprazole  Injectable 40 milliGRAM(s) IV Push every 24 hours  thiamine Injectable 100 milliGRAM(s) IV Push every 24 hours    MEDICATIONS  (PRN):      No Known Allergies      LABS                        12.3   4.02  )-----------( 267      ( 21 Aug 2023 07:58 )             38.0     08-22    139  |  105  |  14  ----------------------------<  53<LL>  See Note   |  16<L>  |  0.66    Ca    9.2      22 Aug 2023 07:11  Phos  2.9     08-22  Mg     2.0     08-22    TPro  8.0  /  Alb  4.7  /  TBili  0.4  /  DBili  x   /  AST  21  /  ALT  9<L>  /  AlkPhos  74  08-20    PT/INR - ( 21 Aug 2023 07:58 )   PT: 9.9 sec;   INR: 0.86          PTT - ( 21 Aug 2023 07:58 )  PTT:23.9 sec  Urinalysis Basic - ( 22 Aug 2023 07:11 )    Color: x / Appearance: x / SG: x / pH: x  Gluc: 53 mg/dL / Ketone: x  / Bili: x / Urobili: x   Blood: x / Protein: x / Nitrite: x   Leuk Esterase: x / RBC: x / WBC x   Sq Epi: x / Non Sq Epi: x / Bacteria: x    
O/N Events: Patient was in pain s/p PEG placement overnight received tylenol 450 and D5NS at 30cc/hr. Patient had some relief with tylenol    Subjective/ROS: Patient seen and examined at bedside. Aunt was present during morning conversation.    Denies Fever/Chills, HA, CP, SOB, n/v, changes in bowel/urinary habits.  12pt ROS otherwise negative.    VITALS  Vital Signs Last 24 Hrs  T(C): 36.7 (23 Aug 2023 12:43), Max: 36.7 (22 Aug 2023 23:00)  T(F): 98 (23 Aug 2023 12:43), Max: 98 (22 Aug 2023 23:00)  HR: 90 (23 Aug 2023 12:43) (90 - 99)  BP: 110/74 (23 Aug 2023 12:43) (108/74 - 124/88)  BP(mean): --  RR: 18 (23 Aug 2023 12:43) (18 - 18)  SpO2: 98% (23 Aug 2023 12:43) (98% - 100%)    Parameters below as of 23 Aug 2023 12:43  Patient On (Oxygen Delivery Method): room air        CAPILLARY BLOOD GLUCOSE          PHYSICAL EXAM patient is somnolent during conversation and limited examination  General: NAD,   Head: NC/AT;   Neck: Supple; no JVD  Respiratory: CTAB; no wheezes/rales/rhonchi  Cardiovascular: Regular rhythm/rate; S1/S2+, no murmurs, rubs gallops   Gastrointestinal: Soft; Tenderness to palpation, pain is generalized over entire abdomen. PEG in place,  Extremities: WWP; no edema/cyanosis  Neurological: A&Ox3, no obvious focal deficits    MEDICATIONS  (STANDING):  dextrose 5% + sodium chloride 0.9%. 500 milliLiter(s) (30 mL/Hr) IV Continuous <Continuous>  folic acid Injectable 1 milliGRAM(s) IV Push every 24 hours  pantoprazole  Injectable 40 milliGRAM(s) IV Push every 24 hours  thiamine Injectable 100 milliGRAM(s) IV Push every 24 hours    MEDICATIONS  (PRN):      No Known Allergies      LABS                        8.1    6.67  )-----------( 219      ( 23 Aug 2023 18:55 )             24.6     08-23    138  |  103  |  8   ----------------------------<  109<H>  3.5   |  25  |  0.57    Ca    8.8      23 Aug 2023 18:55  Phos  3.4     08-23  Mg     1.6     08-23        Urinalysis Basic - ( 23 Aug 2023 18:55 )    Color: x / Appearance: x / SG: x / pH: x  Gluc: 109 mg/dL / Ketone: x  / Bili: x / Urobili: x   Blood: x / Protein: x / Nitrite: x   Leuk Esterase: x / RBC: x / WBC x   Sq Epi: x / Non Sq Epi: x / Bacteria: x              IMAGING/EKG/ETC  
O/N Events: no acute events overnight    Subjective/ROS: Patient seen and examined at bedside. Patient is sleepy during examination. Family member was at bedside.    Denies Fever/Chills, HA, CP, SOB, n/v/d/c.    VITALS  Vital Signs Last 24 Hrs  T(C): 36.8 (24 Aug 2023 12:18), Max: 36.9 (23 Aug 2023 21:29)  T(F): 98.2 (24 Aug 2023 12:18), Max: 98.4 (23 Aug 2023 21:29)  HR: 100 (24 Aug 2023 12:18) (100 - 109)  BP: 115/77 (24 Aug 2023 12:18) (115/77 - 115/77)  BP(mean): --  RR: 17 (24 Aug 2023 12:18) (17 - 18)  SpO2: 98% (24 Aug 2023 12:18) (98% - 98%)    Parameters below as of 24 Aug 2023 12:18  Patient On (Oxygen Delivery Method): room air        CAPILLARY BLOOD GLUCOSE          PHYSICAL EXAM  General: NAD  Head: NC/AT; MMM; PERRL; EOMI;  Neck: Supple; no JVD  Respiratory: CTAB; no wheezes  Cardiovascular: Regular rhythm/rate; S1/S2+, no murmur  Gastrointestinal: Soft; Diffuse tenderness to palpation same as yesterday 8/23  Extremities: WWP; no edema/cyanosis  Neurological: A&Ox3; no obvious focal deficits    MEDICATIONS  (STANDING):  dextrose 5% + sodium chloride 0.9%. 500 milliLiter(s) (30 mL/Hr) IV Continuous <Continuous>  folic acid Injectable 1 milliGRAM(s) IV Push every 24 hours  pantoprazole  Injectable 40 milliGRAM(s) IV Push every 24 hours  thiamine Injectable 100 milliGRAM(s) IV Push every 24 hours    MEDICATIONS  (PRN):      No Known Allergies      LABS                        7.4    5.19  )-----------( 185      ( 24 Aug 2023 17:02 )             22.9     08-24    140  |  104  |  8   ----------------------------<  98  3.2<L>   |  26  |  0.58    Ca    8.7      24 Aug 2023 05:30  Phos  2.3     08-24  Mg     1.9     08-24        Urinalysis Basic - ( 24 Aug 2023 05:30 )    Color: x / Appearance: x / SG: x / pH: x  Gluc: 98 mg/dL / Ketone: x  / Bili: x / Urobili: x   Blood: x / Protein: x / Nitrite: x   Leuk Esterase: x / RBC: x / WBC x   Sq Epi: x / Non Sq Epi: x / Bacteria: x              IMAGING/EKG/ETC

## 2023-08-25 NOTE — PROGRESS NOTE ADULT - PROBLEM SELECTOR PLAN 2
AG remains elevated, bicarb 18. Lactate neg. BHB 6.7. Pt has not had any nutrition for 2 days. No alcohol ingestions. No tylenol use. Likely due to a starvation ketoacidosis.  - check BMP in AM  - fluids as above

## 2023-08-29 ENCOUNTER — APPOINTMENT (OUTPATIENT)
Dept: INTERNAL MEDICINE | Facility: CLINIC | Age: 28
End: 2023-08-29

## 2023-08-31 DIAGNOSIS — R00.0 TACHYCARDIA, UNSPECIFIED: ICD-10-CM

## 2023-08-31 DIAGNOSIS — K59.00 CONSTIPATION, UNSPECIFIED: ICD-10-CM

## 2023-08-31 DIAGNOSIS — R62.7 ADULT FAILURE TO THRIVE: ICD-10-CM

## 2023-08-31 DIAGNOSIS — K94.21 GASTROSTOMY HEMORRHAGE: ICD-10-CM

## 2023-08-31 DIAGNOSIS — K91.840 POSTPROCEDURAL HEMORRHAGE OF A DIGESTIVE SYSTEM ORGAN OR STRUCTURE FOLLOWING A DIGESTIVE SYSTEM PROCEDURE: ICD-10-CM

## 2023-08-31 DIAGNOSIS — K29.00 ACUTE GASTRITIS WITHOUT BLEEDING: ICD-10-CM

## 2023-08-31 DIAGNOSIS — F50.01 ANOREXIA NERVOSA, RESTRICTING TYPE: ICD-10-CM

## 2023-08-31 DIAGNOSIS — K66.8 OTHER SPECIFIED DISORDERS OF PERITONEUM: ICD-10-CM

## 2023-08-31 DIAGNOSIS — F41.9 ANXIETY DISORDER, UNSPECIFIED: ICD-10-CM

## 2023-08-31 DIAGNOSIS — Z46.59 ENCOUNTER FOR FITTING AND ADJUSTMENT OF OTHER GASTROINTESTINAL APPLIANCE AND DEVICE: ICD-10-CM

## 2023-08-31 DIAGNOSIS — E43 UNSPECIFIED SEVERE PROTEIN-CALORIE MALNUTRITION: ICD-10-CM

## 2023-08-31 DIAGNOSIS — D72.819 DECREASED WHITE BLOOD CELL COUNT, UNSPECIFIED: ICD-10-CM

## 2023-08-31 DIAGNOSIS — K91.870 POSTPROCEDURAL HEMATOMA OF A DIGESTIVE SYSTEM ORGAN OR STRUCTURE FOLLOWING A DIGESTIVE SYSTEM PROCEDURE: ICD-10-CM
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
## 2023-08-31 DIAGNOSIS — F42.9 OBSESSIVE-COMPULSIVE DISORDER, UNSPECIFIED: ICD-10-CM

## 2023-08-31 DIAGNOSIS — E87.29 OTHER ACIDOSIS: ICD-10-CM

## 2023-08-31 DIAGNOSIS — K66.1 HEMOPERITONEUM: ICD-10-CM

## 2024-06-12 NOTE — H&P ADULT - NSHPPHYSICALEXAM_GEN_ALL_CORE
for cystoscopy .  VITAL SIGNS:  T(F): 97.5 (08-20-23 @ 22:08), Max: 97.5 (08-20-23 @ 22:08)  HR: 58 (08-20-23 @ 22:08) (58 - 110)  BP: 107/73 (08-20-23 @ 22:08) (107/73 - 111/81)  BP(mean): --  RR: 16 (08-20-23 @ 22:08) (16 - 16)  SpO2: 99% (08-20-23 @ 22:08) (99% - 99%)    PHYSICAL EXAM:    Constitutional: NAD, laying in stretcher  HEENT: NC/AT, PERRL, EOMI, anicteric sclera, no nasal discharge; uvula midline, no oropharyngeal erythema or exudates; dry oral MM  Neck: supple, no thyromegaly  Respiratory: CTA B/L; no W/R/R, no retractions  Cardiac: +S1/S2; tachycardic, regular; no M/R/G  Gastrointestinal: soft, NT/ND; no rebound or guarding; +BSx4  Extremities: very thin extremities, warm and dry, no LE edema  Musculoskeletal: NROM x4; no joint swelling, tenderness or erythema  Vascular: 2+ radial, DP pulses B/L  Dermatologic: pale skin, skin warm, dry and intact; no rashes, wounds  Neurologic: AAOx3; CNII-XII intact; no focal deficits  Psychiatric: calm, reserved

## 2024-09-25 NOTE — PROVIDER CONTACT NOTE (CRITICAL VALUE NOTIFICATION) - BACKGROUND
Pt has PMHx of anorexia, OCD, anxiety and currently admitted for failure to thrive. Pt is bedbound, incontinent x2, and refuses to take any oral medications.
no